# Patient Record
Sex: MALE | Race: WHITE | NOT HISPANIC OR LATINO | Employment: OTHER | URBAN - METROPOLITAN AREA
[De-identification: names, ages, dates, MRNs, and addresses within clinical notes are randomized per-mention and may not be internally consistent; named-entity substitution may affect disease eponyms.]

---

## 2017-04-12 ENCOUNTER — HOSPITAL ENCOUNTER (OUTPATIENT)
Dept: GENERAL RADIOLOGY | Facility: HOSPITAL | Age: 57
Discharge: HOME | End: 2017-04-12
Admitting: NURSE PRACTITIONER

## 2017-04-12 ENCOUNTER — TRANSCRIBE ORDERS (OUTPATIENT)
Dept: ADMINISTRATIVE | Facility: HOSPITAL | Age: 57
End: 2017-04-12

## 2017-04-12 DIAGNOSIS — M25.562 LEFT MEDIAL KNEE PAIN: Primary | ICD-10-CM

## 2017-04-12 DIAGNOSIS — M79.644 THUMB PAIN, RIGHT: ICD-10-CM

## 2017-04-12 PROCEDURE — 73130 X-RAY EXAM OF HAND: CPT

## 2017-04-12 PROCEDURE — 73562 X-RAY EXAM OF KNEE 3: CPT

## 2019-11-01 ENCOUNTER — TRANSCRIBE ORDERS (OUTPATIENT)
Dept: ADMINISTRATIVE | Facility: HOSPITAL | Age: 59
End: 2019-11-01

## 2019-11-01 DIAGNOSIS — M43.10 SPONDYLOLISTHESIS, ACQUIRED: Primary | ICD-10-CM

## 2019-11-15 ENCOUNTER — HOSPITAL ENCOUNTER (OUTPATIENT)
Dept: MRI IMAGING | Facility: HOSPITAL | Age: 59
Discharge: HOME | End: 2019-11-15

## 2019-11-15 DIAGNOSIS — M43.10 SPONDYLOLISTHESIS, ACQUIRED: ICD-10-CM

## 2019-11-19 ENCOUNTER — APPOINTMENT (OUTPATIENT)
Dept: MRI IMAGING | Facility: HOSPITAL | Age: 59
End: 2019-11-19

## 2021-02-23 ENCOUNTER — TRANSCRIBE ORDERS (OUTPATIENT)
Dept: ADMINISTRATIVE | Facility: HOSPITAL | Age: 61
End: 2021-02-23

## 2021-02-23 DIAGNOSIS — R31.29 HEMATURIA, MICROSCOPIC: Primary | ICD-10-CM

## 2021-04-07 ENCOUNTER — HOSPITAL ENCOUNTER (OUTPATIENT)
Dept: CT IMAGING | Facility: HOSPITAL | Age: 61
Discharge: HOME | End: 2021-04-07
Admitting: FAMILY MEDICINE

## 2021-04-07 DIAGNOSIS — R31.29 HEMATURIA, MICROSCOPIC: ICD-10-CM

## 2021-04-07 LAB — CREAT BLDA-MCNC: 0.9 MG/DL (ref 0.6–1.3)

## 2021-04-07 PROCEDURE — 25010000002 IOPAMIDOL 61 % SOLUTION: Performed by: FAMILY MEDICINE

## 2021-04-07 PROCEDURE — 82565 ASSAY OF CREATININE: CPT

## 2021-04-07 PROCEDURE — 74178 CT ABD&PLV WO CNTR FLWD CNTR: CPT

## 2021-04-07 RX ADMIN — IOPAMIDOL 100 ML: 612 INJECTION, SOLUTION INTRAVENOUS at 09:55

## 2021-08-31 ENCOUNTER — HOSPITAL ENCOUNTER (OUTPATIENT)
Dept: GENERAL RADIOLOGY | Facility: HOSPITAL | Age: 61
Discharge: HOME | End: 2021-08-31
Admitting: FAMILY MEDICINE

## 2021-08-31 ENCOUNTER — TRANSCRIBE ORDERS (OUTPATIENT)
Dept: ADMINISTRATIVE | Facility: HOSPITAL | Age: 61
End: 2021-08-31

## 2021-08-31 DIAGNOSIS — R07.81 RIB PAIN ON LEFT SIDE: Primary | ICD-10-CM

## 2021-08-31 DIAGNOSIS — R07.81 RIB PAIN ON LEFT SIDE: ICD-10-CM

## 2021-08-31 PROCEDURE — 71101 X-RAY EXAM UNILAT RIBS/CHEST: CPT

## 2021-09-17 ENCOUNTER — HOSPITAL ENCOUNTER (OUTPATIENT)
Dept: GENERAL RADIOLOGY | Facility: HOSPITAL | Age: 61
Discharge: HOME | End: 2021-09-17
Admitting: NURSE PRACTITIONER

## 2021-09-17 ENCOUNTER — TRANSCRIBE ORDERS (OUTPATIENT)
Dept: GENERAL RADIOLOGY | Facility: HOSPITAL | Age: 61
End: 2021-09-17

## 2021-09-17 DIAGNOSIS — M79.671 FOOT PAIN, RIGHT: Primary | ICD-10-CM

## 2021-09-17 PROCEDURE — 73630 X-RAY EXAM OF FOOT: CPT

## 2023-11-30 ENCOUNTER — TRANSCRIBE ORDERS (OUTPATIENT)
Dept: ADMINISTRATIVE | Facility: HOSPITAL | Age: 63
End: 2023-11-30
Payer: COMMERCIAL

## 2023-11-30 DIAGNOSIS — R10.13 ABDOMINAL PAIN, EPIGASTRIC: Primary | ICD-10-CM

## 2024-02-29 ENCOUNTER — APPOINTMENT (OUTPATIENT)
Dept: GENERAL RADIOLOGY | Facility: HOSPITAL | Age: 64
DRG: 682 | End: 2024-02-29
Payer: MEDICARE

## 2024-02-29 ENCOUNTER — HOSPITAL ENCOUNTER (INPATIENT)
Facility: HOSPITAL | Age: 64
LOS: 1 days | Discharge: HOME | DRG: 682 | End: 2024-03-02
Attending: EMERGENCY MEDICINE | Admitting: PEDIATRICS
Payer: MEDICARE

## 2024-02-29 DIAGNOSIS — I95.9 HYPOTENSION, UNSPECIFIED HYPOTENSION TYPE: ICD-10-CM

## 2024-02-29 DIAGNOSIS — R00.0 TACHYCARDIA: ICD-10-CM

## 2024-02-29 DIAGNOSIS — E87.1 HYPONATREMIA: ICD-10-CM

## 2024-02-29 DIAGNOSIS — N17.9 ACUTE KIDNEY INJURY: ICD-10-CM

## 2024-02-29 DIAGNOSIS — E87.6 HYPOKALEMIA: Primary | ICD-10-CM

## 2024-02-29 PROBLEM — R11.0 NAUSEA: Status: ACTIVE | Noted: 2024-02-29

## 2024-02-29 PROBLEM — R63.4 WEIGHT LOSS: Status: ACTIVE | Noted: 2024-02-29

## 2024-02-29 PROBLEM — R42 DIZZINESS: Status: ACTIVE | Noted: 2024-02-29

## 2024-02-29 LAB
ALBUMIN SERPL-MCNC: 4.3 G/DL (ref 3.5–5.2)
ALBUMIN/GLOB SERPL: 1.5 G/DL
ALP SERPL-CCNC: 110 U/L (ref 39–117)
ALT SERPL W P-5'-P-CCNC: 119 U/L (ref 1–41)
ANION GAP SERPL CALCULATED.3IONS-SCNC: 15 MMOL/L (ref 5–15)
AST SERPL-CCNC: 54 U/L (ref 1–40)
BASOPHILS # BLD AUTO: 0.03 10*3/MM3 (ref 0–0.2)
BASOPHILS NFR BLD AUTO: 0.2 % (ref 0–1.5)
BILIRUB SERPL-MCNC: 0.7 MG/DL (ref 0–1.2)
BUN SERPL-MCNC: 21 MG/DL (ref 8–23)
BUN/CREAT SERPL: 12.8 (ref 7–25)
CALCIUM SPEC-SCNC: 12 MG/DL (ref 8.6–10.5)
CHLORIDE SERPL-SCNC: 89 MMOL/L (ref 98–107)
CO2 SERPL-SCNC: 19 MMOL/L (ref 22–29)
CREAT SERPL-MCNC: 1.64 MG/DL (ref 0.76–1.27)
DEPRECATED RDW RBC AUTO: 43.7 FL (ref 37–54)
EGFRCR SERPLBLD CKD-EPI 2021: 46.1 ML/MIN/1.73
EOSINOPHIL # BLD AUTO: 0.06 10*3/MM3 (ref 0–0.4)
EOSINOPHIL NFR BLD AUTO: 0.4 % (ref 0.3–6.2)
ERYTHROCYTE [DISTWIDTH] IN BLOOD BY AUTOMATED COUNT: 12.1 % (ref 12.3–15.4)
GEN 5 2HR TROPONIN T REFLEX: 32 NG/L
GLOBULIN UR ELPH-MCNC: 2.9 GM/DL
GLUCOSE SERPL-MCNC: 120 MG/DL (ref 65–99)
HCT VFR BLD AUTO: 36.9 % (ref 37.5–51)
HGB BLD-MCNC: 13.4 G/DL (ref 13–17.7)
HOLD SPECIMEN: NORMAL
IMM GRANULOCYTES # BLD AUTO: 0.11 10*3/MM3 (ref 0–0.05)
IMM GRANULOCYTES NFR BLD AUTO: 0.7 % (ref 0–0.5)
LYMPHOCYTES # BLD AUTO: 0.82 10*3/MM3 (ref 0.7–3.1)
LYMPHOCYTES NFR BLD AUTO: 5.4 % (ref 19.6–45.3)
MAGNESIUM SERPL-MCNC: 2.2 MG/DL (ref 1.6–2.4)
MCH RBC QN AUTO: 35.5 PG (ref 26.6–33)
MCHC RBC AUTO-ENTMCNC: 36.3 G/DL (ref 31.5–35.7)
MCV RBC AUTO: 97.9 FL (ref 79–97)
MONOCYTES # BLD AUTO: 1.47 10*3/MM3 (ref 0.1–0.9)
MONOCYTES NFR BLD AUTO: 9.7 % (ref 5–12)
NEUTROPHILS NFR BLD AUTO: 12.71 10*3/MM3 (ref 1.7–7)
NEUTROPHILS NFR BLD AUTO: 83.6 % (ref 42.7–76)
NRBC BLD AUTO-RTO: 0 /100 WBC (ref 0–0.2)
NT-PROBNP SERPL-MCNC: 194.9 PG/ML (ref 0–900)
PHOSPHATE SERPL-MCNC: 1.6 MG/DL (ref 2.5–4.5)
PLATELET # BLD AUTO: 222 10*3/MM3 (ref 140–450)
PMV BLD AUTO: 9 FL (ref 6–12)
POTASSIUM SERPL-SCNC: 2.4 MMOL/L (ref 3.5–5.2)
PROT SERPL-MCNC: 7.2 G/DL (ref 6–8.5)
QT INTERVAL: 332 MS
QTC INTERVAL: 444 MS
RBC # BLD AUTO: 3.77 10*6/MM3 (ref 4.14–5.8)
SODIUM SERPL-SCNC: 123 MMOL/L (ref 136–145)
SODIUM SERPL-SCNC: 130 MMOL/L (ref 136–145)
TROPONIN T DELTA: -2 NG/L
TROPONIN T SERPL HS-MCNC: 34 NG/L
TROPONIN T SERPL HS-MCNC: 34 NG/L
TSH SERPL DL<=0.05 MIU/L-ACNC: 2.19 UIU/ML (ref 0.27–4.2)
WBC NRBC COR # BLD AUTO: 15.2 10*3/MM3 (ref 3.4–10.8)
WHOLE BLOOD HOLD COAG: NORMAL
WHOLE BLOOD HOLD SPECIMEN: NORMAL

## 2024-02-29 PROCEDURE — 0 DEXTROSE 5 % SOLUTION: Performed by: NURSE PRACTITIONER

## 2024-02-29 PROCEDURE — 85025 COMPLETE CBC W/AUTO DIFF WBC: CPT | Performed by: EMERGENCY MEDICINE

## 2024-02-29 PROCEDURE — 84100 ASSAY OF PHOSPHORUS: CPT | Performed by: INTERNAL MEDICINE

## 2024-02-29 PROCEDURE — G0378 HOSPITAL OBSERVATION PER HR: HCPCS

## 2024-02-29 PROCEDURE — 84295 ASSAY OF SERUM SODIUM: CPT | Performed by: NURSE PRACTITIONER

## 2024-02-29 PROCEDURE — 80053 COMPREHEN METABOLIC PANEL: CPT | Performed by: EMERGENCY MEDICINE

## 2024-02-29 PROCEDURE — 83735 ASSAY OF MAGNESIUM: CPT | Performed by: EMERGENCY MEDICINE

## 2024-02-29 PROCEDURE — 84484 ASSAY OF TROPONIN QUANT: CPT | Performed by: NURSE PRACTITIONER

## 2024-02-29 PROCEDURE — 99223 1ST HOSP IP/OBS HIGH 75: CPT | Performed by: INTERNAL MEDICINE

## 2024-02-29 PROCEDURE — 71045 X-RAY EXAM CHEST 1 VIEW: CPT

## 2024-02-29 PROCEDURE — 25810000003 SODIUM CHLORIDE 0.9 % SOLUTION: Performed by: EMERGENCY MEDICINE

## 2024-02-29 PROCEDURE — 99285 EMERGENCY DEPT VISIT HI MDM: CPT

## 2024-02-29 PROCEDURE — 25810000003 SODIUM CHLORIDE 0.9 % SOLUTION: Performed by: NURSE PRACTITIONER

## 2024-02-29 PROCEDURE — 83880 ASSAY OF NATRIURETIC PEPTIDE: CPT | Performed by: EMERGENCY MEDICINE

## 2024-02-29 PROCEDURE — 25010000002 HEPARIN (PORCINE) PER 1000 UNITS: Performed by: NURSE PRACTITIONER

## 2024-02-29 PROCEDURE — 84443 ASSAY THYROID STIM HORMONE: CPT | Performed by: EMERGENCY MEDICINE

## 2024-02-29 PROCEDURE — 25010000002 THIAMINE PER 100 MG: Performed by: NURSE PRACTITIONER

## 2024-02-29 PROCEDURE — 36415 COLL VENOUS BLD VENIPUNCTURE: CPT

## 2024-02-29 PROCEDURE — 25010000002 POTASSIUM CHLORIDE 10 MEQ/100ML SOLUTION: Performed by: EMERGENCY MEDICINE

## 2024-02-29 PROCEDURE — 93005 ELECTROCARDIOGRAM TRACING: CPT | Performed by: EMERGENCY MEDICINE

## 2024-02-29 PROCEDURE — 84484 ASSAY OF TROPONIN QUANT: CPT | Performed by: EMERGENCY MEDICINE

## 2024-02-29 RX ORDER — LORAZEPAM 1 MG/1
2 TABLET ORAL
Status: DISCONTINUED | OUTPATIENT
Start: 2024-02-29 | End: 2024-03-02 | Stop reason: HOSPADM

## 2024-02-29 RX ORDER — HEPARIN SODIUM 5000 [USP'U]/ML
5000 INJECTION, SOLUTION INTRAVENOUS; SUBCUTANEOUS EVERY 8 HOURS SCHEDULED
Status: DISCONTINUED | OUTPATIENT
Start: 2024-02-29 | End: 2024-03-02 | Stop reason: HOSPADM

## 2024-02-29 RX ORDER — MIDAZOLAM HYDROCHLORIDE 1 MG/ML
4 INJECTION INTRAMUSCULAR; INTRAVENOUS
Status: DISCONTINUED | OUTPATIENT
Start: 2024-02-29 | End: 2024-03-02 | Stop reason: HOSPADM

## 2024-02-29 RX ORDER — SODIUM CHLORIDE 9 MG/ML
100 INJECTION, SOLUTION INTRAVENOUS CONTINUOUS
Status: DISCONTINUED | OUTPATIENT
Start: 2024-02-29 | End: 2024-02-29

## 2024-02-29 RX ORDER — ONDANSETRON 4 MG/1
4 TABLET, FILM COATED ORAL EVERY 8 HOURS PRN
COMMUNITY
End: 2024-02-29

## 2024-02-29 RX ORDER — LORAZEPAM 1 MG/1
1 TABLET ORAL
Status: DISCONTINUED | OUTPATIENT
Start: 2024-02-29 | End: 2024-03-02 | Stop reason: HOSPADM

## 2024-02-29 RX ORDER — THIAMINE HYDROCHLORIDE 100 MG/ML
200 INJECTION, SOLUTION INTRAMUSCULAR; INTRAVENOUS EVERY 8 HOURS SCHEDULED
Status: DISCONTINUED | OUTPATIENT
Start: 2024-02-29 | End: 2024-03-02 | Stop reason: HOSPADM

## 2024-02-29 RX ORDER — BISACODYL 10 MG
10 SUPPOSITORY, RECTAL RECTAL DAILY PRN
Status: DISCONTINUED | OUTPATIENT
Start: 2024-02-29 | End: 2024-03-02 | Stop reason: HOSPADM

## 2024-02-29 RX ORDER — POLYETHYLENE GLYCOL 3350 17 G/17G
17 POWDER, FOR SOLUTION ORAL DAILY PRN
Status: DISCONTINUED | OUTPATIENT
Start: 2024-02-29 | End: 2024-03-02 | Stop reason: HOSPADM

## 2024-02-29 RX ORDER — ALPRAZOLAM 0.25 MG/1
0.12 TABLET ORAL DAILY PRN
Status: DISCONTINUED | OUTPATIENT
Start: 2024-02-29 | End: 2024-03-02

## 2024-02-29 RX ORDER — PRAZOSIN HYDROCHLORIDE 5 MG/1
5 CAPSULE ORAL NIGHTLY
COMMUNITY

## 2024-02-29 RX ORDER — BISACODYL 5 MG/1
5 TABLET, DELAYED RELEASE ORAL DAILY PRN
Status: DISCONTINUED | OUTPATIENT
Start: 2024-02-29 | End: 2024-03-02 | Stop reason: HOSPADM

## 2024-02-29 RX ORDER — ONDANSETRON 4 MG/1
4 TABLET, ORALLY DISINTEGRATING ORAL EVERY 8 HOURS PRN
COMMUNITY

## 2024-02-29 RX ORDER — SODIUM CHLORIDE 9 MG/ML
40 INJECTION, SOLUTION INTRAVENOUS AS NEEDED
Status: CANCELLED | OUTPATIENT
Start: 2024-02-29

## 2024-02-29 RX ORDER — MIDAZOLAM HYDROCHLORIDE 1 MG/ML
2 INJECTION INTRAMUSCULAR; INTRAVENOUS
Status: DISCONTINUED | OUTPATIENT
Start: 2024-02-29 | End: 2024-03-02 | Stop reason: HOSPADM

## 2024-02-29 RX ORDER — PANTOPRAZOLE SODIUM 40 MG/1
40 TABLET, DELAYED RELEASE ORAL
Status: DISCONTINUED | OUTPATIENT
Start: 2024-03-01 | End: 2024-03-02 | Stop reason: HOSPADM

## 2024-02-29 RX ORDER — AMOXICILLIN 250 MG
2 CAPSULE ORAL 2 TIMES DAILY PRN
Status: DISCONTINUED | OUTPATIENT
Start: 2024-02-29 | End: 2024-03-02 | Stop reason: HOSPADM

## 2024-02-29 RX ORDER — SODIUM CHLORIDE 0.9 % (FLUSH) 0.9 %
10 SYRINGE (ML) INJECTION AS NEEDED
Status: DISCONTINUED | OUTPATIENT
Start: 2024-02-29 | End: 2024-03-02 | Stop reason: HOSPADM

## 2024-02-29 RX ORDER — PRAZOSIN HYDROCHLORIDE 1 MG/1
5 CAPSULE ORAL NIGHTLY
Status: CANCELLED | OUTPATIENT
Start: 2024-02-29

## 2024-02-29 RX ORDER — DEXTROSE MONOHYDRATE 50 MG/ML
75 INJECTION, SOLUTION INTRAVENOUS CONTINUOUS
Status: DISCONTINUED | OUTPATIENT
Start: 2024-02-29 | End: 2024-03-02

## 2024-02-29 RX ORDER — OMEPRAZOLE 40 MG/1
40 CAPSULE, DELAYED RELEASE ORAL DAILY
COMMUNITY

## 2024-02-29 RX ORDER — POTASSIUM CHLORIDE 7.45 MG/ML
10 INJECTION INTRAVENOUS ONCE
Status: COMPLETED | OUTPATIENT
Start: 2024-02-29 | End: 2024-02-29

## 2024-02-29 RX ORDER — ALPRAZOLAM 0.25 MG/1
0.12 TABLET ORAL DAILY PRN
COMMUNITY

## 2024-02-29 RX ORDER — FOLIC ACID 1 MG/1
1 TABLET ORAL DAILY
Status: DISCONTINUED | OUTPATIENT
Start: 2024-02-29 | End: 2024-03-02 | Stop reason: HOSPADM

## 2024-02-29 RX ORDER — PRAVASTATIN SODIUM 40 MG
40 TABLET ORAL DAILY
COMMUNITY

## 2024-02-29 RX ORDER — SODIUM CHLORIDE 0.9 % (FLUSH) 0.9 %
10 SYRINGE (ML) INJECTION EVERY 12 HOURS SCHEDULED
Status: DISCONTINUED | OUTPATIENT
Start: 2024-02-29 | End: 2024-03-02 | Stop reason: HOSPADM

## 2024-02-29 RX ORDER — ONDANSETRON 4 MG/1
4 TABLET, ORALLY DISINTEGRATING ORAL EVERY 8 HOURS PRN
Status: DISCONTINUED | OUTPATIENT
Start: 2024-02-29 | End: 2024-03-02 | Stop reason: HOSPADM

## 2024-02-29 RX ORDER — POTASSIUM CHLORIDE 1.5 G/1.58G
40 POWDER, FOR SOLUTION ORAL ONCE
Status: COMPLETED | OUTPATIENT
Start: 2024-02-29 | End: 2024-02-29

## 2024-02-29 RX ORDER — CLOTRIMAZOLE 1 %
1 CREAM (GRAM) TOPICAL 2 TIMES DAILY
COMMUNITY
End: 2024-03-02 | Stop reason: HOSPADM

## 2024-02-29 RX ORDER — PRAVASTATIN SODIUM 40 MG
40 TABLET ORAL NIGHTLY
Status: DISCONTINUED | OUTPATIENT
Start: 2024-02-29 | End: 2024-03-02 | Stop reason: HOSPADM

## 2024-02-29 RX ADMIN — POTASSIUM & SODIUM PHOSPHATES POWDER PACK 280-160-250 MG 2 PACKET: 280-160-250 PACK at 18:47

## 2024-02-29 RX ADMIN — PRAVASTATIN SODIUM 40 MG: 40 TABLET ORAL at 21:46

## 2024-02-29 RX ADMIN — SODIUM CHLORIDE 1000 ML: 9 INJECTION, SOLUTION INTRAVENOUS at 14:46

## 2024-02-29 RX ADMIN — POTASSIUM CHLORIDE 40 MEQ: 1.5 POWDER, FOR SOLUTION ORAL at 14:52

## 2024-02-29 RX ADMIN — SODIUM CHLORIDE 100 ML/HR: 9 INJECTION, SOLUTION INTRAVENOUS at 18:15

## 2024-02-29 RX ADMIN — Medication 10 ML: at 21:44

## 2024-02-29 RX ADMIN — THIAMINE HYDROCHLORIDE 200 MG: 100 INJECTION, SOLUTION INTRAMUSCULAR; INTRAVENOUS at 21:43

## 2024-02-29 RX ADMIN — FOLIC ACID 1 MG: 1 TABLET ORAL at 21:46

## 2024-02-29 RX ADMIN — HEPARIN SODIUM 5000 UNITS: 5000 INJECTION INTRAVENOUS; SUBCUTANEOUS at 21:47

## 2024-02-29 RX ADMIN — POTASSIUM CHLORIDE 10 MEQ: 7.46 INJECTION, SOLUTION INTRAVENOUS at 14:54

## 2024-02-29 RX ADMIN — ALPRAZOLAM 0.12 MG: 0.25 TABLET ORAL at 22:15

## 2024-02-29 RX ADMIN — DEXTROSE MONOHYDRATE 75 ML/HR: 50 INJECTION, SOLUTION INTRAVENOUS at 21:44

## 2024-02-29 RX ADMIN — SODIUM CHLORIDE 1000 ML: 9 INJECTION, SOLUTION INTRAVENOUS at 14:57

## 2024-02-29 NOTE — H&P
[facility] Medicine Services  HISTORY AND PHYSICAL    Patient Name: Campos Alba  : 1960  MRN: [id number]  Primary Care Physician: Kelby Mann MD  Date of admission: 2024    Subjective   Subjective     Chief Complaint:  Dizziness, Nausea, weight loss    HPI:  Campos Alba is a 63 y.o. male with PMH of HTN, HLP who presented to the ED at the request of his PCP for EKG changes.  He denies chest pain.  His primary complaint in nausea, dizziness and unintentional 50lb weight loss since October.  These symptoms have waxed and waned since October, but he feels they have gotten worse over the last week.  He was found to have multiple electrolyte abnormalities and is being admitted to the hospitalist service for further treatment.        Review of Systems   Constitutional:  Positive for appetite change and fatigue. Negative for fever.   HENT:  Negative for congestion and hearing loss.    Eyes:  Negative for visual disturbance.   Respiratory:  Positive for shortness of breath. Negative for chest tightness.    Cardiovascular:  Negative for chest pain and palpitations.   Gastrointestinal:  Positive for nausea and vomiting. Negative for abdominal pain, constipation and diarrhea.   Genitourinary:  Negative for dysuria and hematuria.   Musculoskeletal:  Negative for back pain.   Neurological:  Positive for dizziness and weakness.   Psychiatric/Behavioral:  Negative for behavioral problems and confusion.         Personal History     Past Medical History:   Diagnosis Date    HTN (hypertension)     Hyperlipidemia        Past Surgical History:   Procedure Laterality Date    ANKLE SURGERY Left 2006    TOTAL HIP ARTHROPLASTY Right 2012    WISDOM TOOTH EXTRACTION         Family History: family history includes Alzheimer's disease in his father; Arthritis in his father; Hypertension in his father; No Known Problems in his mother.     Social History:  reports that he has never  smoked. He does not have any smokeless tobacco history on file. He reports current alcohol use. He reports that he does not use drugs.  Social History     Social History Narrative    Lives alone in [place]    Not active with HH    Retired        Medications:  ALPRAZolam, clotrimazole, omeprazole, ondansetron ODT, pravastatin, and prazosin    No Known Allergies    Objective   Objective     Vital Signs:   Temp:  [98.2 °F (36.8 °C)] 98.2 °F (36.8 °C)  Heart Rate:  [] 86  Resp:  [14] 14  BP: ()/(54-91) 125/86    Physical Exam   Constitutional: Awake, alert, friends at bedside  Eyes: PERRLA, sclerae anicteric, no conjunctival injection  HENT: NCAT, mucous membranes moist  Neck: Supple, no thyromegaly, no lymphadenopathy, trachea midline  Respiratory: Clear to auscultation bilaterally, nonlabored respirations   Cardiovascular: RRR, no murmurs, rubs, or gallops, palpable pedal pulses bilaterally  Gastrointestinal: Positive bowel sounds, soft, nontender, nondistended  Musculoskeletal: No bilateral ankle edema, no clubbing or cyanosis to extremities  Psychiatric: Appropriate affect, cooperative  Neurologic: Oriented x 3, KUMAR, speech clear  Skin: No rashes noted    Result Review:  I have personally reviewed the results from the time of this admission to 2/29/2024 17:37 EST and agree with these findings:  [x]  Laboratory list / accordion  []  Microbiology  []  Radiology  [x]  EKG/Telemetry   []  Cardiology/Vascular   []  Pathology  [x]  Old records  []  Other:      LAB RESULTS:      Lab 02/29/24  1300   WBC 15.20*   HEMOGLOBIN 13.4   HEMATOCRIT 36.9*   PLATELETS 222   NEUTROS ABS 12.71*   IMMATURE GRANS (ABS) 0.11*   LYMPHS ABS 0.82   MONOS ABS 1.47*   EOS ABS 0.06   MCV 97.9*         Lab 02/29/24  1300   SODIUM 123*   POTASSIUM 2.4*   CHLORIDE 89*   CO2 19.0*   ANION GAP 15.0   BUN 21   CREATININE 1.64*   EGFR 46.1*   GLUCOSE 120*   CALCIUM 12.0*   MAGNESIUM 2.2   PHOSPHORUS 1.6*   TSH 2.190         Lab  02/29/24  1300   TOTAL PROTEIN 7.2   ALBUMIN 4.3   GLOBULIN 2.9   ALT (SGPT) 119*   AST (SGOT) 54*   BILIRUBIN 0.7   ALK PHOS 110         Lab 02/29/24  1300   PROBNP 194.9   HSTROP T 34*           Brief Urine Lab Results       None          Microbiology Results (last 10 days)       ** No results found for the last 240 hours. **            XR Chest 1 View    Result Date: 2/29/2024  XR CHEST 1 VW Date of Exam: 2/29/2024 12:14 PM EST Indication: Dysrhythmia triage protocol Comparison: 8/31/2021 Findings: The lungs appear adequately aerated without consolidation or mass. No pleural effusion or pneumothorax is identified. The cardiomediastinal silhouette and pulmonary vasculature appear within normal limits. No acute or suspicious osseous lesion is identified.     Impression: Impression: 1.No acute radiographic abnormality is identified. Electronically Signed: Vitaliy Smith MD  2/29/2024 12:43 PM EST  Workstation ID: [id number]         Assessment & Plan   Assessment & Plan       Hyponatremia    Hypokalemia    Nausea    Dizziness    Weight loss    ZAIRE (acute kidney injury)    Nausea  Unintentional weight loss (50lbs since October)  Poor appetite  --GI consult  --EGD in Nov 23 which was reportedly negative  --uses CBD gummies    Dizziness  --check CT head with contrast 3/1/24 if creatinine improved  --questionable med related + dehydration  --check orthostatics    Hyponatremia  Hypokalemia  Hypophosphatemia  --has had 2L saline in ED, will recheck Na before ordering more IVF  --replace per protocol  --recheck labs in am, Na check q4h overnight    ZAIRE  --recheck in am after IVF    ETOH use   --drinks on a daily basis (few beers + bourbon)  --states his last drink was a week ago  --continue home xanax  --withdrawal protocol  --addiction team to see  --low Na may be his baseline with ETOH use    EKG changes  --troponin 34, will trend  --possible ST depression on 1st EKG in ED  --denies chest pain, continue to  monitor    Recent Balanitis  --follows with Dr. Simmons  --office notes mention possible need for biopsy    Total time spent: 60 minutes  Time spent includes time reviewing chart, face-to-face time, counseling patient/family/caregiver, ordering medications/tests/procedures, communicating with other health care professionals, documenting clinical information in the electronic health record, and coordination of care.      DVT prophylaxis:  Lovenox    CODE STATUS:    Medical Intervention Limits: NO intubation (DNI)  Level Of Support Discussed With: Patient  Code Status (Patient has no pulse and is not breathing): No CPR (Do Not Attempt to Resuscitate)  Medical Interventions (Patient has pulse or is breathing): Limited Support      Expected Discharge  Expected Discharge Date: 3/2/2024; Expected Discharge Time:       This note has been completed as part of a split-shared workflow.     Signature: Electronically signed by CHIKA Arevalo, 02/29/24, 5:37 PM EST    Patient seen and examined at the bedside.  Patient is a 63-year-old with past medical history significant for hypertension, hyperlipidemia, AAA drinks almost every day.  Evidently patient has had nausea since last October.  He tells me that it started suddenly but it has continued and it has worsened.  He tells me that the severity of it actually fluctuates a little bit.  That has affected his appetite in a major way and it seems that he has lost quite a bit of weight for the past several months.  Patient denies vomiting except a few episodes.  Patient has been referred to gastroenterology by his PCP and patient has seen Dr. Sethi.  He tells me that Dr. Sethi has done an upper endoscopy.  Patient tells me that the endoscopy revealed gastritis and there was some suspicion of celiac disease also.  Patient is being admitted for severe nausea and inability to have adequate oral intake.  Patient also tells me that since the onset of nausea he has noticed that  gradually his memory has weakened.  Lately he also has had dizziness and fatigue.  Denies any fever or chills.  No chest pain or palpitation.    Constitutional: No acute distress, awake, alert  HENT: NCAT, mucous membranes moist  Respiratory: Clear to auscultation bilaterally, respiratory effort normal   Cardiovascular: RRR, no murmurs, rubs, or gallops  Gastrointestinal: Positive bowel sounds, soft, nontender, nondistended  Musculoskeletal: No bilateral ankle edema  Psychiatric: Appropriate affect, cooperative  Neurologic: Oriented x 3, strength symmetric in all extremities, Cranial Nerves grossly intact to confrontation, speech clear  Skin: No rashes     Assessment and plan:  63-year-old male with past medical history of hypertension, hyperlipidemia.  He drinks regularly.  He also uses CBD Gummies.  He has had nausea since last October which has gradually gotten worse.  Lately he has had dizziness, fatigue, difficulty with memory also.    Evaluation at the emergency room also shows elevated creatinine at 2.4, hyponatremia, hypophosphatemia. Will admit the patient to telemetry for observation.   recommend imaging of the brain either by CT with contrast been patient's renal function allows or MRI without contrast.  Total time spent was 55 minutes.

## 2024-02-29 NOTE — ED NOTES
" Campos Alba    Nursing Report ED to Floor:  Mental status: AO4  Ambulatory status: X1  Oxygen Therapy:  RA  Cardiac Rhythm: SINUS WITH 1ST DEGREE BLOCK  Admitted from: ED  Safety Concerns:  FALL RISK  Social Issues: NONE  ED Room #:  12    ED Nurse Phone Extension - [phone] or may call [phone].      HPI:   Chief Complaint   Patient presents with    Rapid Heart Rate       Past Medical History:  Past Medical History:   Diagnosis Date    HTN (hypertension)     Hyperlipidemia         Past Surgical History:  Past Surgical History:   Procedure Laterality Date    ANKLE SURGERY Left 2006    TOTAL HIP ARTHROPLASTY Right 2012    WISDOM TOOTH EXTRACTION          Admitting Doctor:   Leonardo Doran MD    Consulting Provider(s):  Consults       No orders found from 1/31/2024 to 3/1/2024.             Admitting Diagnosis:   The primary encounter diagnosis was Hypokalemia. Diagnoses of Hyponatremia, Acute kidney injury, Tachycardia, and Hypotension, unspecified hypotension type were also pertinent to this visit.    Most Recent Vitals:   Vitals:    02/29/24 1120 02/29/24 1438 02/29/24 1439 02/29/24 1500   BP: 90/54 97/60  107/91   BP Location: Right arm      Patient Position: Sitting      Pulse: 109  95 93   Resp: 14      Temp: 98.2 °F (36.8 °C)      TempSrc: Oral      SpO2: 99%  98% 98%   Weight: 100 kg (221 lb)      Height: 182.9 cm (72\")          Active LDAs/IV Access:   Lines, Drains & Airways       Active LDAs       Name Placement date Placement time Site Days    Peripheral IV 02/29/24 1443 Left Antecubital 02/29/24  1443  Antecubital  less than 1                    Labs (abnormal labs have a star):   Labs Reviewed   COMPREHENSIVE METABOLIC PANEL - Abnormal; Notable for the following components:       Result Value    Glucose 120 (*)     Creatinine 1.64 (*)     Sodium 123 (*)     Potassium 2.4 (*)     Chloride 89 (*)     CO2 19.0 (*)     Calcium 12.0 (*)     ALT (SGPT) 119 (*)     AST (SGOT) 54 (*)     eGFR 46.1 (*)     " All other components within normal limits    Narrative:     GFR Normal >60  Chronic Kidney Disease <60  Kidney Failure <15     SINGLE HSTROPONIN T - Abnormal; Notable for the following components:    HS Troponin T 34 (*)     All other components within normal limits    Narrative:     High Sensitive Troponin T Reference Range:  <14.0 ng/L- Negative Female for AMI  <22.0 ng/L- Negative Male for AMI  >=14 - Abnormal Female indicating possible myocardial injury.  >=22 - Abnormal Male indicating possible myocardial injury.   Clinicians would have to utilize clinical acumen, EKG, Troponin, and serial changes to determine if it is an Acute Myocardial Infarction or myocardial injury due to an underlying chronic condition.        CBC WITH AUTO DIFFERENTIAL - Abnormal; Notable for the following components:    WBC 15.20 (*)     RBC 3.77 (*)     Hematocrit 36.9 (*)     MCV 97.9 (*)     MCH 35.5 (*)     MCHC 36.3 (*)     RDW 12.1 (*)     Neutrophil % 83.6 (*)     Lymphocyte % 5.4 (*)     Immature Grans % 0.7 (*)     Neutrophils, Absolute 12.71 (*)     Monocytes, Absolute 1.47 (*)     Immature Grans, Absolute 0.11 (*)     All other components within normal limits   PHOSPHORUS - Abnormal; Notable for the following components:    Phosphorus 1.6 (*)     All other components within normal limits   MAGNESIUM - Normal   TSH - Normal   BNP (IN-HOUSE) - Normal    Narrative:     This assay is used as an aid in the diagnosis of individuals suspected of having heart failure. It can be used as an aid in the diagnosis of acute decompensated heart failure (ADHF) in patients presenting with signs and symptoms of ADHF to the emergency department (ED). In addition, NT-proBNP of <300 pg/mL indicates ADHF is not likely.    Age Range Result Interpretation  NT-proBNP Concentration (pg/mL:      <50             Positive            >450                   Gray                 300-450                    Negative             <300    50-75            Positive            >900                  Gray                300-900                  Negative            <300      >75             Positive            >1800                  Gray                300-1800                  Negative            <300   RAINBOW DRAW    Narrative:     The following orders were created for panel order [place] Draw.  Procedure                               Abnormality         Status                     ---------                               -----------         ------                     Green Top (Gel)[[id number]]                                  Final result               Lavender Top[[id number]]                                     Final result               Gold Top - SST[[id number]]                                   Final result               Gray Top[[id number]]                                         In process                 Light Blue Top[[id number]]                                   Final result                 Please view results for these tests on the individual orders.   CBC AND DIFFERENTIAL    Narrative:     The following orders were created for panel order CBC & Differential.  Procedure                               Abnormality         Status                     ---------                               -----------         ------                     CBC Auto Differential[[id number]]        Abnormal            Final result                 Please view results for these tests on the individual orders.   GREEN TOP   LAVENDER TOP   GOLD TOP - SST   LIGHT BLUE TOP   GRAY TOP       Meds Given in ED:   Medications   sodium chloride 0.9 % flush 10 mL (has no administration in time range)   sodium chloride 0.9 % bolus 1,000 mL (1,000 mL Intravenous New Bag 2/29/24 1446)   sodium chloride 0.9 % bolus 1,000 mL (1,000 mL Intravenous New Bag 2/29/24 1457)   potassium chloride 10 mEq in 100 mL IVPB (10 mEq Intravenous New Bag 2/29/24 1454)   potassium chloride (KLOR-CON) packet 40 mEq (40 mEq Oral  Given 2/29/24 1302)

## 2024-02-29 NOTE — ED PROVIDER NOTES
"[facility]    EMERGENCY DEPARTMENT ENCOUNTER      Pt Name: Campos Alba  MRN: [id number]  YOB: 1960  Date of evaluation: 2/29/2024  Provider: Dakota Murillo MD    CHIEF COMPLAINT       Chief Complaint   Patient presents with    Rapid Heart Rate         HISTORY OF PRESENT ILLNESS   Campos Alba is a 63 y.o. male who presents to the emergency department ***       Nursing notes were reviewed.    REVIEW OF SYSTEMS     ROS:  A chief complaint appropriate review of systems was completed and is negative except as noted in the HPI.      PAST MEDICAL HISTORY   No past medical history on file.      SURGICAL HISTORY     No past surgical history on file.      CURRENT MEDICATIONS       Current Facility-Administered Medications:     sodium chloride 0.9 % flush 10 mL, 10 mL, Intravenous, PRN, Dakota Murillo MD  No current outpatient medications on file.    ALLERGIES     Patient has no known allergies.    FAMILY HISTORY     No family history on file.       SOCIAL HISTORY       Social History     Socioeconomic History    Marital status:          PHYSICAL EXAM    (up to 7 for level 4, 8 or more for level 5)     Vitals:    02/29/24 1120   BP: 90/54   BP Location: Right arm   Patient Position: Sitting   Pulse: 109   Resp: 14   Temp: 98.2 °F (36.8 °C)   TempSrc: Oral   SpO2: 99%   Weight: 100 kg (221 lb)   Height: 182.9 cm (72\")       ***      DIAGNOSTIC RESULTS     EKG: All EKGs are interpreted by the Emergency Department Physician who either signs or Co-signs this chart in the absence of a cardiologist.    ECG 12 Lead ED Triage Standing Order; Dysrhythmia    (Results Pending)         RADIOLOGY:   [x] Radiologist's Report Reviewed:  XR Chest 1 View    (Results Pending)       I ordered and independently reviewed the above noted radiographic studies.        LABS:    I have reviewed and interpreted all of the currently available lab results from this visit (if applicable):  Results for orders " placed or performed during the hospital encounter of 04/07/21   POC Creatinine    Specimen: Blood   Result Value Ref Range    Creatinine 0.90 0.60 - 1.30 mg/dL        If labs were ordered, I independently reviewed the results and considered them in treating the patient.      EMERGENCY DEPARTMENT COURSE and DIFFERENTIAL DIAGNOSIS/MDM:   Vitals:  AS OF 11:29 EST    BP - 90/54  HR - 109  TEMP - 98.2 °F (36.8 °C) (Oral)  O2 SATS - 99%        Discussion below represents my analysis of pertinent findings related to patient's condition, differential diagnosis, treatment plan and final disposition.      Differential diagnosis:  The differential diagnosis associated with the patient's presentation includes: ***      Independent interpretations (ECG/rhythm strip/X-ray/US/CT scan): ***      Additional sources:  Discussed/obtained information from independent historians:   [] Spouse:   [] Parent:   [] Friend:   [] EMS:   [] Other:  External (non-ED) record review:   [] Inpatient record:   [] Office record:   [] Outpatient record:   [] Prior Outpatient labs:   [] Prior Outpatient radiology:   [] Primary Care record:   [] Outside ED record:   [] Other:       Patient's care impacted by:   [] Diabetes   [] Hypertension   [] Coronary Artery Disease   [] Cancer   [] Other:     Care significantly affected by Social Determinants of Health (housing and economic circumstances, unemployment)    [] Yes     [] No   If yes, Patient's care significantly limited by  Social Determinants of Health including:    [] Inadequate housing    [] Low income    [] Alcoholism and drug addiction in family    [] Problems related to primary support group    [] Unemployment    [] Problems related to employment    [] Other Social Determinants of Health:       Consideration of admission/observation vs discharge: ***      I considered prescription management with: ***   [] Pain medication:   [] Antiviral:   [] Antibiotic:   [] Other:    Additional orders  considered but not ordered:  The following testing was considered but ultimately not selected after discussion with patient/family: ***    ED Course:           ***    I had a discussion with the patient/family regarding diagnosis, diagnostic results, treatment plan, and medications.  The patient/family indicated understanding of these instructions.  I spent adequate time at the bedside preceding discharge necessary to personally discuss the aftercare instructions, giving patient education, providing explanations of the results of our evaluations/findings, and my decision making to assure that the patient/family understand the plan of care.  Time was allotted to answer questions at that time and throughout the ED course.  Emphasis was placed on timely follow-up after discharge.  I also discussed the potential for the development of an acute emergent condition requiring further evaluation, admission, or even surgical intervention. I discussed that we found nothing during the visit today indicating the need for further workup, admission, or the presence of an unstable medical condition.  I encouraged the patient to return to the emergency department immediately for ANY concerns, worsening, new complaints, or if symptoms persist and unable to seek follow-up in a timely fashion.  The patient/family expressed understanding and agreement with this plan.  The patient will follow-up with their PCP in 1-2 days for reevaluation.           PROCEDURES:  Procedures    CRITICAL CARE TIME        FINAL IMPRESSION    No diagnosis found.      DISPOSITION/PLAN     ED Disposition       None              Comment: Please note this report has been produced using speech recognition software.      Dakota Murillo MD  Attending Emergency Physician           Workstation ID: [id number]      XR Chest 1 View   Final Result   Impression:   1.No acute radiographic abnormality is identified.      Electronically Signed: Vitaliy Smith MD     2/29/2024 12:43 PM EST     Workstation ID: [id number]          I ordered and independently reviewed the above noted radiographic studies.        LABS:    I have reviewed and interpreted all of the currently available lab results from this visit (if applicable):  Results for orders placed or performed during the hospital encounter of 02/29/24   Comprehensive Metabolic Panel    Specimen: Blood   Result Value Ref Range    Glucose 120 (H) 65 - 99 mg/dL    BUN 21 8 - 23 mg/dL    Creatinine 1.64 (H) 0.76 - 1.27 mg/dL    Sodium 123 (L) 136 - 145 mmol/L    Potassium 2.4 (C) 3.5 - 5.2 mmol/L    Chloride 89 (L) 98 - 107 mmol/L    CO2 19.0 (L) 22.0 - 29.0 mmol/L    Calcium 12.0 (H) 8.6 - 10.5 mg/dL    Total Protein 7.2 6.0 - 8.5 g/dL    Albumin 4.3 3.5 - 5.2 g/dL    ALT (SGPT) 119 (H) 1 - 41 U/L    AST (SGOT) 54 (H) 1 - 40 U/L    Alkaline Phosphatase 110 39 - 117 U/L    Total Bilirubin 0.7 0.0 - 1.2 mg/dL    Globulin 2.9 gm/dL    A/G Ratio 1.5 g/dL    BUN/Creatinine Ratio 12.8 7.0 - 25.0    Anion Gap 15.0 5.0 - 15.0 mmol/L    eGFR 46.1 (L) >60.0 mL/min/1.73   Magnesium    Specimen: Blood   Result Value Ref Range    Magnesium 2.2 1.6 - 2.4 mg/dL   Single High Sensitivity Troponin T    Specimen: Blood   Result Value Ref Range    HS Troponin T 34 (H) <22 ng/L   TSH    Specimen: Blood   Result Value Ref Range    TSH 2.190 0.270 - 4.200 uIU/mL   BNP    Specimen: Blood   Result Value Ref Range    proBNP 194.9 0.0 - 900.0 pg/mL   CBC Auto Differential    Specimen: Blood   Result Value Ref Range    WBC 15.20 (H) 3.40 - 10.80 10*3/mm3    RBC 3.77 (L) 4.14 - 5.80 10*6/mm3    Hemoglobin 13.4 13.0 - 17.7 g/dL    Hematocrit 36.9 (L) 37.5 - 51.0 %    MCV 97.9 (H) 79.0 - 97.0 fL    MCH 35.5 (H) 26.6 - 33.0 pg    MCHC 36.3 (H) 31.5 - 35.7 g/dL    RDW 12.1 (L) 12.3 -  15.4 %    RDW-SD 43.7 37.0 - 54.0 fl    MPV 9.0 6.0 - 12.0 fL    Platelets 222 140 - 450 10*3/mm3    Neutrophil % 83.6 (H) 42.7 - 76.0 %    Lymphocyte % 5.4 (L) 19.6 - 45.3 %    Monocyte % 9.7 5.0 - 12.0 %    Eosinophil % 0.4 0.3 - 6.2 %    Basophil % 0.2 0.0 - 1.5 %    Immature Grans % 0.7 (H) 0.0 - 0.5 %    Neutrophils, Absolute 12.71 (H) 1.70 - 7.00 10*3/mm3    Lymphocytes, Absolute 0.82 0.70 - 3.10 10*3/mm3    Monocytes, Absolute 1.47 (H) 0.10 - 0.90 10*3/mm3    Eosinophils, Absolute 0.06 0.00 - 0.40 10*3/mm3    Basophils, Absolute 0.03 0.00 - 0.20 10*3/mm3    Immature Grans, Absolute 0.11 (H) 0.00 - 0.05 10*3/mm3    nRBC 0.0 0.0 - 0.2 /100 WBC   Phosphorus    Specimen: Blood   Result Value Ref Range    Phosphorus 1.6 (C) 2.5 - 4.5 mg/dL   High Sensitivity Troponin T    Specimen: Blood   Result Value Ref Range    HS Troponin T 34 (H) <22 ng/L   Sodium    Specimen: Blood   Result Value Ref Range    Sodium 130 (L) 136 - 145 mmol/L   Sodium    Specimen: Blood   Result Value Ref Range    Sodium 130 (L) 136 - 145 mmol/L   High Sensitivity Troponin T 2Hr    Specimen: Blood   Result Value Ref Range    HS Troponin T 32 (H) <22 ng/L    Troponin T Delta -2 >=-4 - <+4 ng/L   Phosphorus    Specimen: Blood   Result Value Ref Range    Phosphorus 1.5 (C) 2.5 - 4.5 mg/dL   CBC Auto Differential    Specimen: Blood   Result Value Ref Range    WBC 8.21 3.40 - 10.80 10*3/mm3    RBC 3.15 (L) 4.14 - 5.80 10*6/mm3    Hemoglobin 11.2 (L) 13.0 - 17.7 g/dL    Hematocrit 30.0 (L) 37.5 - 51.0 %    MCV 95.2 79.0 - 97.0 fL    MCH 35.6 (H) 26.6 - 33.0 pg    MCHC 37.3 (H) 31.5 - 35.7 g/dL    RDW 12.1 (L) 12.3 - 15.4 %    RDW-SD 42.2 37.0 - 54.0 fl    MPV 9.1 6.0 - 12.0 fL    Platelets 160 140 - 450 10*3/mm3    Neutrophil % 80.6 (H) 42.7 - 76.0 %    Lymphocyte % 9.1 (L) 19.6 - 45.3 %    Monocyte % 9.1 5.0 - 12.0 %    Eosinophil % 0.5 0.3 - 6.2 %    Basophil % 0.1 0.0 - 1.5 %    Immature Grans % 0.6 (H) 0.0 - 0.5 %    Neutrophils, Absolute 6.61  1.70 - 7.00 10*3/mm3    Lymphocytes, Absolute 0.75 0.70 - 3.10 10*3/mm3    Monocytes, Absolute 0.75 0.10 - 0.90 10*3/mm3    Eosinophils, Absolute 0.04 0.00 - 0.40 10*3/mm3    Basophils, Absolute 0.01 0.00 - 0.20 10*3/mm3    Immature Grans, Absolute 0.05 0.00 - 0.05 10*3/mm3    nRBC 0.0 0.0 - 0.2 /100 WBC   Basic Metabolic Panel    Specimen: Blood   Result Value Ref Range    Glucose 92 65 - 99 mg/dL    BUN 20 8 - 23 mg/dL    Creatinine 1.34 (H) 0.76 - 1.27 mg/dL    Sodium 126 (L) 136 - 145 mmol/L    Potassium 2.4 (C) 3.5 - 5.2 mmol/L    Chloride 98 98 - 107 mmol/L    CO2 17.0 (L) 22.0 - 29.0 mmol/L    Calcium 10.8 (H) 8.6 - 10.5 mg/dL    BUN/Creatinine Ratio 14.9 7.0 - 25.0    Anion Gap 11.0 5.0 - 15.0 mmol/L    eGFR 58.8 (L) >60.0 mL/min/1.73   Magnesium    Specimen: Blood   Result Value Ref Range    Magnesium 2.0 1.6 - 2.4 mg/dL   Phosphorus    Specimen: Blood   Result Value Ref Range    Phosphorus 1.7 (C) 2.5 - 4.5 mg/dL   High Sensitivity Troponin T    Specimen: Blood   Result Value Ref Range    HS Troponin T 31 (H) <22 ng/L   Sodium    Specimen: Blood   Result Value Ref Range    Sodium 129 (L) 136 - 145 mmol/L   Phosphorus    Specimen: Blood   Result Value Ref Range    Phosphorus 1.4 (C) 2.5 - 4.5 mg/dL   Basic Metabolic Panel    Specimen: Blood   Result Value Ref Range    Glucose 104 (H) 65 - 99 mg/dL    BUN 14 8 - 23 mg/dL    Creatinine 1.14 0.76 - 1.27 mg/dL    Sodium 129 (L) 136 - 145 mmol/L    Potassium 2.8 (L) 3.5 - 5.2 mmol/L    Chloride 100 98 - 107 mmol/L    CO2 15.0 (L) 22.0 - 29.0 mmol/L    Calcium 10.9 (H) 8.6 - 10.5 mg/dL    BUN/Creatinine Ratio 12.3 7.0 - 25.0    Anion Gap 14.0 5.0 - 15.0 mmol/L    eGFR 71.4 >60.0 mL/min/1.73   Phosphorus    Specimen: Blood   Result Value Ref Range    Phosphorus 2.5 2.5 - 4.5 mg/dL   Hepatitis Panel, Acute    Specimen: Blood   Result Value Ref Range    Hepatitis B Surface Ag Non-Reactive Non-Reactive    Hep A IgM Non-Reactive Non-Reactive    Hep B C IgM  Non-Reactive Non-Reactive    Hepatitis C Ab Non-Reactive Non-Reactive   Basic Metabolic Panel    Specimen: Blood   Result Value Ref Range    Glucose 96 65 - 99 mg/dL    BUN 11 8 - 23 mg/dL    Creatinine 0.98 0.76 - 1.27 mg/dL    Sodium 125 (L) 136 - 145 mmol/L    Potassium 3.1 (L) 3.5 - 5.2 mmol/L    Chloride 97 (L) 98 - 107 mmol/L    CO2 15.0 (L) 22.0 - 29.0 mmol/L    Calcium 10.7 (H) 8.6 - 10.5 mg/dL    BUN/Creatinine Ratio 11.2 7.0 - 25.0    Anion Gap 13.0 5.0 - 15.0 mmol/L    eGFR 85.6 >60.0 mL/min/1.73   Magnesium    Specimen: Blood   Result Value Ref Range    Magnesium 1.8 1.6 - 2.4 mg/dL   Phosphorus    Specimen: Blood   Result Value Ref Range    Phosphorus 2.1 (L) 2.5 - 4.5 mg/dL   CBC Auto Differential    Specimen: Blood   Result Value Ref Range    WBC 8.41 3.40 - 10.80 10*3/mm3    RBC 3.34 (L) 4.14 - 5.80 10*6/mm3    Hemoglobin 11.8 (L) 13.0 - 17.7 g/dL    Hematocrit 32.0 (L) 37.5 - 51.0 %    MCV 95.8 79.0 - 97.0 fL    MCH 35.3 (H) 26.6 - 33.0 pg    MCHC 36.9 (H) 31.5 - 35.7 g/dL    RDW 12.0 (L) 12.3 - 15.4 %    RDW-SD 42.6 37.0 - 54.0 fl    MPV 9.2 6.0 - 12.0 fL    Platelets 158 140 - 450 10*3/mm3    Neutrophil % 78.3 (H) 42.7 - 76.0 %    Lymphocyte % 10.7 (L) 19.6 - 45.3 %    Monocyte % 9.0 5.0 - 12.0 %    Eosinophil % 1.0 0.3 - 6.2 %    Basophil % 0.2 0.0 - 1.5 %    Immature Grans % 0.8 (H) 0.0 - 0.5 %    Neutrophils, Absolute 6.58 1.70 - 7.00 10*3/mm3    Lymphocytes, Absolute 0.90 0.70 - 3.10 10*3/mm3    Monocytes, Absolute 0.76 0.10 - 0.90 10*3/mm3    Eosinophils, Absolute 0.08 0.00 - 0.40 10*3/mm3    Basophils, Absolute 0.02 0.00 - 0.20 10*3/mm3    Immature Grans, Absolute 0.07 (H) 0.00 - 0.05 10*3/mm3    nRBC 0.0 0.0 - 0.2 /100 WBC   Potassium    Specimen: Blood   Result Value Ref Range    Potassium 3.3 (L) 3.5 - 5.2 mmol/L   Phosphorus    Specimen: Blood   Result Value Ref Range    Phosphorus 2.2 (L) 2.5 - 4.5 mg/dL   Sodium    Specimen: Blood   Result Value Ref Range    Sodium 128 (L) 136 - 145  mmol/L   Basic Metabolic Panel    Specimen: Blood   Result Value Ref Range    Glucose 92 65 - 99 mg/dL    BUN 10 8 - 23 mg/dL    Creatinine 1.06 0.76 - 1.27 mg/dL    Sodium 128 (L) 136 - 145 mmol/L    Potassium 3.1 (L) 3.5 - 5.2 mmol/L    Chloride 99 98 - 107 mmol/L    CO2 17.0 (L) 22.0 - 29.0 mmol/L    Calcium 10.9 (H) 8.6 - 10.5 mg/dL    BUN/Creatinine Ratio 9.4 7.0 - 25.0    Anion Gap 12.0 5.0 - 15.0 mmol/L    eGFR 77.9 >60.0 mL/min/1.73   ECG 12 Lead ED Triage Standing Order; Dysrhythmia   Result Value Ref Range    QT Interval 332 ms    QTC Interval 444 ms   ECG 12 Lead Rhythm Change   Result Value Ref Range    QT Interval 372 ms    QTC Interval 447 ms   Green Top (Gel)   Result Value Ref Range    Extra Tube Hold for add-ons.    Lavender Top   Result Value Ref Range    Extra Tube hold for add-on    Gold Top - SST   Result Value Ref Range    Extra Tube Hold for add-ons.    Gray Top   Result Value Ref Range    Extra Tube Hold for add-ons.    Light Blue Top   Result Value Ref Range    Extra Tube Hold for add-ons.         If labs were ordered, I independently reviewed the results and considered them in treating the patient.      EMERGENCY DEPARTMENT COURSE and DIFFERENTIAL DIAGNOSIS/MDM:   Vitals:  AS OF 13:37 EDT    BP - 133/88  HR - 69  TEMP - 98.3 °F (36.8 °C) (Oral)  O2 SATS - 98%        Discussion below represents my analysis of pertinent findings related to patient's condition, differential diagnosis, treatment plan and final disposition.      Differential diagnosis:  The differential diagnosis associated with the patient's presentation includes: gastroparesis, biliary pathology, acute pancreatitis/hepatitis, dehydration, electroltye derangement      Independent interpretations (ECG/rhythm strip/X-ray/US/CT scan): I independently interpreted the pt CXR and cardiac monitor - there is no pulm infiltrate and the pt is in nsr      Patient's care impacted by:   [] Diabetes   [x] Hypertension   [] Coronary Artery  Disease   [] Cancer   [] Other:     Care significantly affected by Social Determinants of Health (housing and economic circumstances, unemployment)    [] Yes     [x] No   If yes, Patient's care significantly limited by  Social Determinants of Health including:    [] Inadequate housing    [] Low income    [] Alcoholism and drug addiction in family    [] Problems related to primary support group    [] Unemployment    [] Problems related to employment    [] Other Social Determinants of Health:       Consideration of admission/observation vs discharge: Pt w significiant hypokalemia, req admission      ED Course:    ED Course as of 03/20/24 1337   Thu Feb 29, 2024   1507 Patient has acute kidney injury, hypokalemia, hyponatremia. Has had several weeks of decreased appetite, nausea, and recurrent vomiting. Has been worked up as an outpatient including endoscopy without any obvious cause. He has no associated abdominal pain. Creatinine today is 1.6, potassium 2.4, sodium 123. Have started IV fluids as well as IV and p.o. potassium. [NS]   1507 Spoke with hospitalist Dr. Doran. Discussed history, presentation, workup. Accepts pt for admission. [NS]      ED Course User Index  [NS] Dakota Murillo MD         CRITICAL CARE TIME    Approximately 35 minutes of discontinuous critical care time was provided to this patient by myself absent of any time spent performing procedures.  Patient presents critically ill with significant hypokalemia placing the cv system at risk requiring the following interventions: IV potassium replacement, interpretation of labs/ecg/imaging, freq reassessment, coordination of admission\.  Patient at high risk of deterioration and possibly death without these interventions.      FINAL IMPRESSION      1. Hypokalemia    2. Hyponatremia    3. Acute kidney injury    4. Tachycardia    5. Hypotension, unspecified hypotension type          DISPOSITION/PLAN     ED Disposition       ED Disposition    Decision to Admit    Condition   --    Comment   Level of Care: Telemetry [5]   Diagnosis: Hyponatremia [198519]   Admitting Physician: EDITH MORALES [[id number]]                   Comment: Please note this report has been produced using speech recognition software.      Dakota Murillo MD  Attending Emergency Physician             Dakota Murillo MD  03/20/24 9713

## 2024-03-01 PROBLEM — E43 SEVERE MALNUTRITION: Status: ACTIVE | Noted: 2024-03-01

## 2024-03-01 LAB
ANION GAP SERPL CALCULATED.3IONS-SCNC: 11 MMOL/L (ref 5–15)
ANION GAP SERPL CALCULATED.3IONS-SCNC: 14 MMOL/L (ref 5–15)
BASOPHILS # BLD AUTO: 0.01 10*3/MM3 (ref 0–0.2)
BASOPHILS NFR BLD AUTO: 0.1 % (ref 0–1.5)
BUN SERPL-MCNC: 14 MG/DL (ref 8–23)
BUN SERPL-MCNC: 20 MG/DL (ref 8–23)
BUN/CREAT SERPL: 12.3 (ref 7–25)
BUN/CREAT SERPL: 14.9 (ref 7–25)
CALCIUM SPEC-SCNC: 10.8 MG/DL (ref 8.6–10.5)
CALCIUM SPEC-SCNC: 10.9 MG/DL (ref 8.6–10.5)
CHLORIDE SERPL-SCNC: 100 MMOL/L (ref 98–107)
CHLORIDE SERPL-SCNC: 98 MMOL/L (ref 98–107)
CO2 SERPL-SCNC: 15 MMOL/L (ref 22–29)
CO2 SERPL-SCNC: 17 MMOL/L (ref 22–29)
CREAT SERPL-MCNC: 1.14 MG/DL (ref 0.76–1.27)
CREAT SERPL-MCNC: 1.34 MG/DL (ref 0.76–1.27)
DEPRECATED RDW RBC AUTO: 42.2 FL (ref 37–54)
EGFRCR SERPLBLD CKD-EPI 2021: 58.8 ML/MIN/1.73
EGFRCR SERPLBLD CKD-EPI 2021: 71.4 ML/MIN/1.73
EOSINOPHIL # BLD AUTO: 0.04 10*3/MM3 (ref 0–0.4)
EOSINOPHIL NFR BLD AUTO: 0.5 % (ref 0.3–6.2)
ERYTHROCYTE [DISTWIDTH] IN BLOOD BY AUTOMATED COUNT: 12.1 % (ref 12.3–15.4)
GLUCOSE SERPL-MCNC: 104 MG/DL (ref 65–99)
GLUCOSE SERPL-MCNC: 92 MG/DL (ref 65–99)
HAV IGM SERPL QL IA: NORMAL
HBV CORE IGM SERPL QL IA: NORMAL
HBV SURFACE AG SERPL QL IA: NORMAL
HCT VFR BLD AUTO: 30 % (ref 37.5–51)
HCV AB SER DONR QL: NORMAL
HGB BLD-MCNC: 11.2 G/DL (ref 13–17.7)
IMM GRANULOCYTES # BLD AUTO: 0.05 10*3/MM3 (ref 0–0.05)
IMM GRANULOCYTES NFR BLD AUTO: 0.6 % (ref 0–0.5)
LYMPHOCYTES # BLD AUTO: 0.75 10*3/MM3 (ref 0.7–3.1)
LYMPHOCYTES NFR BLD AUTO: 9.1 % (ref 19.6–45.3)
MAGNESIUM SERPL-MCNC: 2 MG/DL (ref 1.6–2.4)
MCH RBC QN AUTO: 35.6 PG (ref 26.6–33)
MCHC RBC AUTO-ENTMCNC: 37.3 G/DL (ref 31.5–35.7)
MCV RBC AUTO: 95.2 FL (ref 79–97)
MONOCYTES # BLD AUTO: 0.75 10*3/MM3 (ref 0.1–0.9)
MONOCYTES NFR BLD AUTO: 9.1 % (ref 5–12)
NEUTROPHILS NFR BLD AUTO: 6.61 10*3/MM3 (ref 1.7–7)
NEUTROPHILS NFR BLD AUTO: 80.6 % (ref 42.7–76)
NRBC BLD AUTO-RTO: 0 /100 WBC (ref 0–0.2)
PHOSPHATE SERPL-MCNC: 1.4 MG/DL (ref 2.5–4.5)
PHOSPHATE SERPL-MCNC: 1.5 MG/DL (ref 2.5–4.5)
PHOSPHATE SERPL-MCNC: 1.7 MG/DL (ref 2.5–4.5)
PHOSPHATE SERPL-MCNC: 2.5 MG/DL (ref 2.5–4.5)
PLATELET # BLD AUTO: 160 10*3/MM3 (ref 140–450)
PMV BLD AUTO: 9.1 FL (ref 6–12)
POTASSIUM SERPL-SCNC: 2.4 MMOL/L (ref 3.5–5.2)
POTASSIUM SERPL-SCNC: 2.8 MMOL/L (ref 3.5–5.2)
QT INTERVAL: 372 MS
QTC INTERVAL: 447 MS
RBC # BLD AUTO: 3.15 10*6/MM3 (ref 4.14–5.8)
SODIUM SERPL-SCNC: 126 MMOL/L (ref 136–145)
SODIUM SERPL-SCNC: 129 MMOL/L (ref 136–145)
SODIUM SERPL-SCNC: 129 MMOL/L (ref 136–145)
SODIUM SERPL-SCNC: 130 MMOL/L (ref 136–145)
TROPONIN T SERPL HS-MCNC: 31 NG/L
WBC NRBC COR # BLD AUTO: 8.21 10*3/MM3 (ref 3.4–10.8)

## 2024-03-01 PROCEDURE — 80048 BASIC METABOLIC PNL TOTAL CA: CPT | Performed by: PEDIATRICS

## 2024-03-01 PROCEDURE — 85025 COMPLETE CBC W/AUTO DIFF WBC: CPT | Performed by: NURSE PRACTITIONER

## 2024-03-01 PROCEDURE — 84295 ASSAY OF SERUM SODIUM: CPT | Performed by: NURSE PRACTITIONER

## 2024-03-01 PROCEDURE — 25010000002 THIAMINE PER 100 MG: Performed by: NURSE PRACTITIONER

## 2024-03-01 PROCEDURE — 80074 ACUTE HEPATITIS PANEL: CPT | Performed by: PHYSICIAN ASSISTANT

## 2024-03-01 PROCEDURE — 0 DEXTROSE 5 % SOLUTION: Performed by: NURSE PRACTITIONER

## 2024-03-01 PROCEDURE — 84484 ASSAY OF TROPONIN QUANT: CPT | Performed by: NURSE PRACTITIONER

## 2024-03-01 PROCEDURE — 84100 ASSAY OF PHOSPHORUS: CPT | Performed by: PEDIATRICS

## 2024-03-01 PROCEDURE — 25010000002 HEPARIN (PORCINE) PER 1000 UNITS: Performed by: NURSE PRACTITIONER

## 2024-03-01 PROCEDURE — 84100 ASSAY OF PHOSPHORUS: CPT | Performed by: INTERNAL MEDICINE

## 2024-03-01 PROCEDURE — 83735 ASSAY OF MAGNESIUM: CPT | Performed by: NURSE PRACTITIONER

## 2024-03-01 PROCEDURE — 99232 SBSQ HOSP IP/OBS MODERATE 35: CPT | Performed by: PEDIATRICS

## 2024-03-01 PROCEDURE — 84100 ASSAY OF PHOSPHORUS: CPT | Performed by: EMERGENCY MEDICINE

## 2024-03-01 PROCEDURE — 25010000002 POTASSIUM CHLORIDE 10 MEQ/100ML SOLUTION: Performed by: PEDIATRICS

## 2024-03-01 PROCEDURE — 97161 PT EVAL LOW COMPLEX 20 MIN: CPT

## 2024-03-01 PROCEDURE — 99222 1ST HOSP IP/OBS MODERATE 55: CPT | Performed by: PHYSICIAN ASSISTANT

## 2024-03-01 PROCEDURE — 84295 ASSAY OF SERUM SODIUM: CPT | Performed by: INTERNAL MEDICINE

## 2024-03-01 PROCEDURE — 97530 THERAPEUTIC ACTIVITIES: CPT

## 2024-03-01 PROCEDURE — 63710000001 ONDANSETRON ODT 4 MG TABLET DISPERSIBLE: Performed by: NURSE PRACTITIONER

## 2024-03-01 PROCEDURE — 25810000003 SODIUM CHLORIDE 0.9 % SOLUTION: Performed by: PEDIATRICS

## 2024-03-01 PROCEDURE — 80048 BASIC METABOLIC PNL TOTAL CA: CPT | Performed by: NURSE PRACTITIONER

## 2024-03-01 PROCEDURE — 84100 ASSAY OF PHOSPHORUS: CPT | Performed by: NURSE PRACTITIONER

## 2024-03-01 PROCEDURE — 25010000002 POTASSIUM CHLORIDE 10 MEQ/100ML SOLUTION: Performed by: INTERNAL MEDICINE

## 2024-03-01 RX ORDER — FENTANYL/ROPIVACAINE/NS/PF 2-625MCG/1
15 PLASTIC BAG, INJECTION (ML) EPIDURAL ONCE
Status: COMPLETED | OUTPATIENT
Start: 2024-03-01 | End: 2024-03-01

## 2024-03-01 RX ORDER — POTASSIUM CHLORIDE 7.45 MG/ML
10 INJECTION INTRAVENOUS
Qty: 600 ML | Refills: 0 | Status: COMPLETED | OUTPATIENT
Start: 2024-03-01 | End: 2024-03-02

## 2024-03-01 RX ORDER — CETIRIZINE HYDROCHLORIDE 10 MG/1
10 TABLET ORAL NIGHTLY
Status: DISCONTINUED | OUTPATIENT
Start: 2024-03-01 | End: 2024-03-02 | Stop reason: HOSPADM

## 2024-03-01 RX ORDER — POTASSIUM CHLORIDE 7.45 MG/ML
10 INJECTION INTRAVENOUS
Status: COMPLETED | OUTPATIENT
Start: 2024-03-01 | End: 2024-03-01

## 2024-03-01 RX ADMIN — THIAMINE HYDROCHLORIDE 200 MG: 100 INJECTION, SOLUTION INTRAMUSCULAR; INTRAVENOUS at 21:02

## 2024-03-01 RX ADMIN — POTASSIUM CHLORIDE 10 MEQ: 7.46 INJECTION, SOLUTION INTRAVENOUS at 09:44

## 2024-03-01 RX ADMIN — ONDANSETRON 4 MG: 4 TABLET, ORALLY DISINTEGRATING ORAL at 19:38

## 2024-03-01 RX ADMIN — THIAMINE HYDROCHLORIDE 200 MG: 100 INJECTION, SOLUTION INTRAMUSCULAR; INTRAVENOUS at 14:01

## 2024-03-01 RX ADMIN — PANTOPRAZOLE SODIUM 40 MG: 40 TABLET, DELAYED RELEASE ORAL at 05:13

## 2024-03-01 RX ADMIN — ONDANSETRON 4 MG: 4 TABLET, ORALLY DISINTEGRATING ORAL at 05:46

## 2024-03-01 RX ADMIN — THIAMINE HYDROCHLORIDE 200 MG: 100 INJECTION, SOLUTION INTRAMUSCULAR; INTRAVENOUS at 05:13

## 2024-03-01 RX ADMIN — HEPARIN SODIUM 5000 UNITS: 5000 INJECTION INTRAVENOUS; SUBCUTANEOUS at 05:13

## 2024-03-01 RX ADMIN — POTASSIUM CHLORIDE 10 MEQ: 7.46 INJECTION, SOLUTION INTRAVENOUS at 11:53

## 2024-03-01 RX ADMIN — HEPARIN SODIUM 5000 UNITS: 5000 INJECTION INTRAVENOUS; SUBCUTANEOUS at 14:01

## 2024-03-01 RX ADMIN — POTASSIUM CHLORIDE 10 MEQ: 7.46 INJECTION, SOLUTION INTRAVENOUS at 06:47

## 2024-03-01 RX ADMIN — POTASSIUM CHLORIDE 10 MEQ: 7.46 INJECTION, SOLUTION INTRAVENOUS at 22:54

## 2024-03-01 RX ADMIN — DEXTROSE MONOHYDRATE 75 ML/HR: 50 INJECTION, SOLUTION INTRAVENOUS at 13:00

## 2024-03-01 RX ADMIN — ALPRAZOLAM 0.12 MG: 0.25 TABLET ORAL at 22:54

## 2024-03-01 RX ADMIN — POTASSIUM CHLORIDE 10 MEQ: 7.46 INJECTION, SOLUTION INTRAVENOUS at 08:33

## 2024-03-01 RX ADMIN — POTASSIUM & SODIUM PHOSPHATES POWDER PACK 280-160-250 MG 2 PACKET: 280-160-250 PACK at 05:14

## 2024-03-01 RX ADMIN — POTASSIUM CHLORIDE 10 MEQ: 7.46 INJECTION, SOLUTION INTRAVENOUS at 10:50

## 2024-03-01 RX ADMIN — Medication 10 ML: at 20:59

## 2024-03-01 RX ADMIN — HEPARIN SODIUM 5000 UNITS: 5000 INJECTION INTRAVENOUS; SUBCUTANEOUS at 21:02

## 2024-03-01 RX ADMIN — PRAVASTATIN SODIUM 40 MG: 40 TABLET ORAL at 20:59

## 2024-03-01 RX ADMIN — POTASSIUM PHOSPHATE, MONOBASIC POTASSIUM PHOSPHATE, DIBASIC 15 MMOL: 224; 236 INJECTION, SOLUTION, CONCENTRATE INTRAVENOUS at 13:00

## 2024-03-01 RX ADMIN — FOLIC ACID 1 MG: 1 TABLET ORAL at 08:34

## 2024-03-01 RX ADMIN — CETIRIZINE HYDROCHLORIDE 10 MG: 10 TABLET, FILM COATED ORAL at 22:54

## 2024-03-01 RX ADMIN — POTASSIUM CHLORIDE 10 MEQ: 7.46 INJECTION, SOLUTION INTRAVENOUS at 05:47

## 2024-03-01 NOTE — PLAN OF CARE
Goal Outcome Evaluation:  Plan of Care Reviewed With: patient           Outcome Evaluation: PT eval complete. Pt demo balance deficits and decreased activity tolerance leading to impairments in functional mobility below baseline. Pt completing side steps to chair w/ FWW, CGA; further mobility limited by N/V and lightheadedness. Pt will benefit from skilled IP PT to address impairments and return to PLOF. PT rec IRF at d/c pending progress.      Anticipated Discharge Disposition (PT): inpatient rehabilitation facility

## 2024-03-01 NOTE — CASE MANAGEMENT/SOCIAL WORK
"Continued Stay Note  [facility]     Patient Name: Campos Alba  MRN: [id number]  Today's Date: 3/1/2024    Admit Date: 2/29/2024    Plan: Declined AUD Resources   Discharge Plan       Row Name 03/01/24 1413       Plan    Plan Declined AUD Resources    Plan Comments Spoke to patient at bedside. When asked about his drinking patient stated, \"I just drink a few beers and bourbon every now and then\".     Patient then went on to talk about his Alprazolam prescription and claimed \"the pharmacy just keeps filling it, I have bottles of it at home\". He went on to say, \"I am not a drug addict\".    It appears that the patient is in denial of the effects alcohol is having on his medical conditions and has no desire to stop drinking at this time.    Encouraged patient to reach out to the Addiction Medicine Team ([phone]) at any time, including post discharge, if he decides he is interested in assistance and support.                    Discharge Codes    No documentation.                 Expected Discharge Date and Time       Expected Discharge Date Expected Discharge Time    Mar 2, 2024               Raiza Mccarthy   Addiction Treatment Navigator  Ext. [phone]   "

## 2024-03-01 NOTE — THERAPY EVALUATION
Patient Name: Campos Alba  : 1960    MRN: [id number]                              Today's Date: 3/1/2024       Admit Date: 2024    Visit Dx:     ICD-10-CM ICD-9-CM   1. Hypokalemia  E87.6 276.8   2. Hyponatremia  E87.1 276.1   3. Acute kidney injury  N17.9 584.9   4. Tachycardia  R00.0 785.0   5. Hypotension, unspecified hypotension type  I95.9 458.9     Patient Active Problem List   Diagnosis    Hyponatremia    Hypokalemia    Nausea    Dizziness    Weight loss    ZAIRE (acute kidney injury)     Past Medical History:   Diagnosis Date    HTN (hypertension)     Hyperlipidemia      Past Surgical History:   Procedure Laterality Date    ANKLE SURGERY Left 2006    TOTAL HIP ARTHROPLASTY Right 2012    WISDOM TOOTH EXTRACTION        General Information       Row Name 24 0941          Physical Therapy Time and Intention    Document Type evaluation  -KE     Mode of Treatment physical therapy  -KE       Row Name 24 0941          General Information    Patient Profile Reviewed yes  -KE     Prior Level of Function independent:;all household mobility;ADL's;cleaning;driving  Ind w/ SPC for household mobility, limited community ambulator. Owns FWW  -KE     Existing Precautions/Restrictions fall  -KE     Barriers to Rehab medically complex  -KE       Row Name 24 0941          Living Environment    People in Home alone  -KE       Row Name 24 0941          Home Main Entrance    Number of Stairs, Main Entrance three  -KE     Stair Railings, Main Entrance none  -KE       Row Name 24 0941          Stairs Within Home, Primary    Stairs, Within Home, Primary flight to second level, not req to navigate  -KE     Number of Stairs, Within Home, Primary twelve  -KE       Row Name 24 0941          Cognition    Orientation Status (Cognition) oriented x 4  -KE       Row Name 24 0941          Safety Issues, Functional Mobility    Safety Issues Affecting Function (Mobility) awareness of  need for assistance;insight into deficits/self-awareness;safety precaution awareness;safety precautions follow-through/compliance  -KE     Impairments Affecting Function (Mobility) balance;endurance/activity tolerance;strength;pain  -KE               User Key  (r) = Recorded By, (t) = Taken By, (c) = Cosigned By      Initials Name Provider Type    Gabriela Stewart PT Physical Therapist                   Mobility       Row Name 03/01/24 0944          Bed Mobility    Bed Mobility supine-sit  -KE     Supine-Sit [place] (Bed Mobility) standby assist  -KE     Assistive Device (Bed Mobility) bed rails;head of bed elevated  -KE     Comment, (Bed Mobility) Pt req increased time and effort to complete, demo good sequencing  -KE       Row Name 03/01/24 0944          Transfers    Comment, (Transfers) x3 STS from EOB  -KE       Row Name 03/01/24 0944          Sit-Stand Transfer    Sit-Stand [place] (Transfers) contact guard  -KE     Assistive Device (Sit-Stand Transfers) walker, front-wheeled  -KE     Comment, (Sit-Stand Transfer) pt req use of momentum to initiate stand, VCs for UE placement  -KE       Row Name 03/01/24 0944          Gait/Stairs (Locomotion)    [place] Level (Gait) contact guard  -KE     Assistive Device (Gait) walker, front-wheeled  -KE     Distance in Feet (Gait) 3  -KE     Deviations/Abnormal Patterns (Gait) base of support, wide;gait speed decreased;stride length decreased;bilateral deviations  -KE     Bilateral Gait Deviations forward flexed posture  -KE     Comment, (Gait/Stairs) Pt taking 6 side steps to chair. Demo wide VIRAJ with decreased step length. Demo poor sequencing of FWW. Further mobility limited by N/V and c/o lightheadedness.  -KE               User Key  (r) = Recorded By, (t) = Taken By, (c) = Cosigned By      Initials Name Provider Type    Gabriela Stewart PT Physical Therapist                   Obj/Interventions       Row Name 03/01/24 0946          Range of Motion  Comprehensive    General Range of Motion bilateral lower extremity ROM WFL  -       Row Name 03/01/24 0946          Strength Comprehensive (MMT)    General Manual Muscle Testing (MMT) Assessment lower extremity strength deficits identified  -KE     Comment, General Manual Muscle Testing (MMT) Assessment B LEs 4/5 based on functional mobility  -       Row Name 03/01/24 0946          Balance    Balance Assessment sitting static balance;sit to stand dynamic balance;sitting dynamic balance;standing static balance;standing dynamic balance  -KE     Static Sitting Balance supervision  -KE     Dynamic Sitting Balance supervision  -KE     Position, Sitting Balance unsupported;sitting edge of bed  -KE     Sit to Stand Dynamic Balance contact guard  -KE     Static Standing Balance contact guard  -KE     Dynamic Standing Balance contact guard  -KE     Position/Device Used, Standing Balance walker, front-wheeled  -KE     Balance Interventions sitting;standing;sit to stand;supported;static;dynamic  -KE     Comment, Balance no overt LOB noted  -KE               User Key  (r) = Recorded By, (t) = Taken By, (c) = Cosigned By      Initials Name Provider Type    Gabriela Stewart, PT Physical Therapist                   Goals/Plan       Row Name 03/01/24 0912          Bed Mobility Goal 1 (PT)    Activity/Assistive Device (Bed Mobility Goal 1, PT) sit to supine/supine to sit  -KE     [place] Level/Cues Needed (Bed Mobility Goal 1, PT) independent  -KE     Time Frame (Bed Mobility Goal 1, PT) long term goal (LTG);10 days  -KE     Progress/Outcomes (Bed Mobility Goal 1, PT) new goal  -       Row Name 03/01/24 0952          Transfer Goal 1 (PT)    Activity/Assistive Device (Transfer Goal 1, PT) sit-to-stand/stand-to-sit;bed-to-chair/chair-to-bed  -KE     [place] Level/Cues Needed (Transfer Goal 1, PT) independent  -KE     Time Frame (Transfer Goal 1, PT) long term goal (LTG);10 days  -KE     Progress/Outcome (Transfer  Goal 1, PT) new goal  -KE       Row Name 03/01/24 0952          Gait Training Goal 1 (PT)    Activity/Assistive Device (Gait Training Goal 1, PT) gait (walking locomotion);assistive device use  -KE     [place] Level (Gait Training Goal 1, PT) standby assist  -KE     Distance (Gait Training Goal 1, PT) 150'  -KE     Time Frame (Gait Training Goal 1, PT) long term goal (LTG);10 days  -KE     Progress/Outcome (Gait Training Goal 1, PT) new goal  -KE       Row Name 03/01/24 0952          Stairs Goal 1 (PT)    Activity/Assistive Device (Stairs Goal 1, PT) stairs, all skills  -KE     [place] Level/Cues Needed (Stairs Goal 1, PT) standby assist  -KE     Number of Stairs (Stairs Goal 1, PT) 3  -KE     Time Frame (Stairs Goal 1, PT) long term goal (LTG);10 days  -KE     Progress/Outcome (Stairs Goal 1, PT) new goal  -KE       Row Name 03/01/24 0952          Therapy Assessment/Plan (PT)    Planned Therapy Interventions (PT) balance training;bed mobility training;gait training;home exercise program;patient/family education;postural re-education;neuromuscular re-education;ROM (range of motion);stair training;strengthening;stretching;transfer training  -               User Key  (r) = Recorded By, (t) = Taken By, (c) = Cosigned By      Initials Name Provider Type    Gabriela Stewart, PT Physical Therapist                   Clinical Impression       Row Name 03/01/24 0949          Pain    Pretreatment Pain Rating 0/10 - no pain  -KE     Posttreatment Pain Rating 0/10 - no pain  -KE     Pain Intervention(s) Ambulation/increased activity;Repositioned  -KE       Row Name 03/01/24 0949          Plan of Care Review    Plan of Care Reviewed With patient  -KE     Outcome Evaluation PT eval complete. Pt demo balance deficits and decreased activity tolerance leading to impairments in functional mobility below baseline. Pt completing side steps to chair w/ FWW, CGA; further mobility limited by N/V and lightheadedness. Pt will  benefit from skilled IP PT to address impairments and return to PLOF. PT rec IRF at d/c pending progress.  -       Row Name 03/01/24 0949          Therapy Assessment/Plan (PT)    Rehab Potential (PT) good, to achieve stated therapy goals  -     Criteria for Skilled Interventions Met (PT) yes;meets criteria;skilled treatment is necessary  -KE     Therapy Frequency (PT) daily  -KE       Row Name 03/01/24 0949          Vital Signs    Pre Systolic BP Rehab 110  -KE     Pre Treatment Diastolic BP 78  -KE     Intra Systolic BP Rehab --  supine: 132/86; sitting 129/69; standing 123/78  -KE     Post Systolic BP Rehab 115  -KE     Post Treatment Diastolic BP 89  -KE     Pretreatment Heart Rate (beats/min) 91  -KE     Intratreatment Heart Rate (beats/min) 122  -KE     Posttreatment Heart Rate (beats/min) 100  -KE     Pre SpO2 (%) 97  -KE     O2 Delivery Pre Treatment room air  -KE     O2 Delivery Intra Treatment room air  -KE     Post SpO2 (%) 94  -KE     O2 Delivery Post Treatment room air  -KE     Pre Patient Position Supine  -KE     Intra Patient Position Standing  -KE     Post Patient Position Sitting  -KE       Row Name 03/01/24 0949          Positioning and Restraints    Pre-Treatment Position in bed  -KE     Post Treatment Position chair  -KE     In Chair notified nsg;reclined;call light within reach;encouraged to call for assist;exit alarm on;waffle cushion  -               User Key  (r) = Recorded By, (t) = Taken By, (c) = Cosigned By      Initials Name Provider Type    Gabriela Stewart, PT Physical Therapist                   Outcome Measures       Row Name 03/01/24 0954 03/01/24 0825       How much help from another person do you currently need...    Turning from your back to your side while in flat bed without using bedrails? 4  -KE 4  -SB    Moving from lying on back to sitting on the side of a flat bed without bedrails? 3  -KE 3  -SB    Moving to and from a bed to a chair (including a wheelchair)? 3   -KE 3  -SB    Standing up from a chair using your arms (e.g., wheelchair, bedside chair)? 3  -KE 3  -SB    Climbing 3-5 steps with a railing? 2  -KE 2  -SB    To walk in hospital room? 3  -KE 3  -SB    AM-PAC 6 Clicks Score (PT) 18  -KE 18  -SB    Highest Level of Mobility Goal 6 --> Walk 10 steps or more  -KE 6 --> Walk 10 steps or more  -SB      Row Name 03/01/24 0954          Functional Assessment    Outcome Measure Options AM-PAC 6 Clicks Basic Mobility (PT)  -SATISH               User Key  (r) = Recorded By, (t) = Taken By, (c) = Cosigned By      Initials Name Provider Type    SB Anthony Jerome, RN Registered Nurse    Gabriela Stewart PT Physical Therapist                                 Physical Therapy Education       Title: PT OT SLP Therapies (In Progress)       Topic: Physical Therapy (In Progress)       Point: Mobility training (In Progress)       Learning Progress Summary             Patient Acceptance, E, NR by SATISH at 3/1/2024 0844                         Point: Home exercise program (Not Started)       Learner Progress:  Not documented in this visit.              Point: Body mechanics (In Progress)       Learning Progress Summary             Patient Acceptance, E, NR by SATISH at 3/1/2024 0844                         Point: Precautions (In Progress)       Learning Progress Summary             Patient Acceptance, E, NR by SATISH at 3/1/2024 0844                                         User Key       Initials Effective Dates Name Provider Type Discipline    SATISH 11/16/23 -  Gabriela Craft, JAIMIE Physical Therapist PT                  PT Recommendation and Plan  Planned Therapy Interventions (PT): balance training, bed mobility training, gait training, home exercise program, patient/family education, postural re-education, neuromuscular re-education, ROM (range of motion), stair training, strengthening, stretching, transfer training  Plan of Care Reviewed With: patient  Outcome Evaluation: PT eval complete. Pt demo  balance deficits and decreased activity tolerance leading to impairments in functional mobility below baseline. Pt completing side steps to chair w/ FWW, CGA; further mobility limited by N/V and lightheadedness. Pt will benefit from skilled IP PT to address impairments and return to PLOF. PT rec IRF at d/c pending progress.     Time Calculation:   PT Evaluation Complexity  History, PT Evaluation Complexity: 1-2 personal factors and/or comorbidities  Examination of Body Systems (PT Eval Complexity): 1-2 elements  Clinical Presentation (PT Evaluation Complexity): stable  Clinical Decision Making (PT Evaluation Complexity): low complexity  Overall Complexity (PT Evaluation Complexity): low complexity     PT Charges       Row Name 03/01/24 0955             Time Calculation    Start Time 0844  -KE      PT Received On 03/01/24  -KE      PT Goal Re-Cert Due Date 03/11/24  -KE         Timed Charges    52939 - PT Therapeutic Activity Minutes 10  -KE         Untimed Charges    PT Eval/Re-eval Minutes 51  -KE         Total Minutes    Timed Charges Total Minutes 10  -KE      Untimed Charges Total Minutes 51  -KE       Total Minutes 61  -KE                User Key  (r) = Recorded By, (t) = Taken By, (c) = Cosigned By      Initials Name Provider Type    Gabriela Stewart PT Physical Therapist                  Therapy Charges for Today       Code Description Service Date Service Provider Modifiers Qty    82848156355 HC PT THERAPEUTIC ACT EA 15 MIN 3/1/2024 Gabriela Craft, PT GP 1    93014568943 HC PT EVAL LOW COMPLEXITY 4 3/1/2024 Gabriela Craft PT GP 1            PT G-Codes  Outcome Measure Options: AM-PAC 6 Clicks Basic Mobility (PT)  AM-PAC 6 Clicks Score (PT): 18  PT Discharge Summary  Anticipated Discharge Disposition (PT): inpatient rehabilitation facility    Gabriela Craft PT  3/1/2024

## 2024-03-01 NOTE — PLAN OF CARE
Goal Outcome Evaluation:         Patient is on RA, NSR, Aox4, no signs of withdrawal during shift, continued IV electrolyte replacement, wasn't nauseous this afternoon, still very little appetite only drank boost supplements today, continued IVFs (D5W at 75), VSS - will continue POC.

## 2024-03-01 NOTE — CASE MANAGEMENT/SOCIAL WORK
"Continued Stay Note  [facility]     Patient Name: Campos Alba  MRN: [id number]  Today's Date: 3/1/2024    Admit Date: 2/29/2024    Plan: Signing off   Discharge Plan       Row Name 03/01/24 1428       Plan    Plan Signing off    Plan Comments Consult received- thank you.  HPI:  63 y.o. male with PMH of HTN, HLP who presented to the ED at the request of his PCP for EKG changes.  He denies chest pain.  His primary complaint in nausea, dizziness and unintentional 50lb weight loss since October.  These symptoms have waxed and waned since October, but he feels they have gotten worse over the last week.  He was found to have multiple electrolyte abnormalities.      Admission labs:  BAL none obtained  UDS none obtained  LFT's- AST/ALT/Total bili= 54/119/0.7  K 2.4  Na 123  PO 4 1.6  CIWAs range today: 0 Highest CIWA thus far= 3      The patient has been assessed by our team and he does not wish to engage in conversations regarding his ETOH use.      States that he \"has a few beers and bourbons every night\"  No ETOH for a week.    He does not feel that his ETOH is a contributory factor to any of his health detriments.      I am unable to  regarding the hyponatremic effects of continued ETOH use.    Nothing further to add from an addiction perspective.        Row Name 03/01/24 1413       Plan    Plan Declined AUD Resources    Plan Comments Spoke to patient at bedside.                   Discharge Codes    No documentation.                 Expected Discharge Date and Time       Expected Discharge Date Expected Discharge Time    Mar 2, 2024               Sarah Crowe RN MA,BSN-  Addiction Medicine     "

## 2024-03-01 NOTE — CASE MANAGEMENT/SOCIAL WORK
Discharge Planning Assessment  [facility]     Patient Name: Campos Alba  MRN: [id number]  Today's Date: 3/1/2024    Admit Date: 2/29/2024    Plan: Home   Discharge Needs Assessment       Row Name 03/01/24 1616       Living Environment    People in Home alone    Current Living Arrangements home    Potentially Unsafe Housing Conditions none    Primary Care Provided by self       Transition Planning    Patient/Family Anticipates Transition to home       Discharge Needs Assessment    Equipment Currently Used at Home none                   Discharge Plan       Row Name 03/01/24 1616       Plan    Plan Home    Patient/Family in Agreement with Plan yes    Plan Comments Spoke with patient at bedside. Patient lives alone in [facility]. He is independent of ADL's. He has a walker and cane that he uses if needed. He is not current with  services. PCP is Jc Mann.  Insurance is [employer]. Patient discharge plan is home with a friend to transport. There was no discharge need identifed at this [facility]. CM will follow.    Final Discharge Disposition Code 01 - home or self-care      Row Name 03/01/24 1428       Plan    Plan Signing off    Plan Comments The patient has been assessed by our team and he does not wish to engage in conversations regarding his ETOH use.  He does not feel that his ETOH is a contributory factor to any of his health detriments.  I am unable to  regarding the hypnatremic effects of continued ETOH use.      Row Name 03/01/24 1413       Plan    Plan Declined AUD Resources    Plan Comments Spoke to patient at bedside.                  Continued Care and Services - Admitted Since 2/29/2024    Coordination has not been started for this encounter.       Expected Discharge Date and Time       Expected Discharge Date Expected Discharge Time    Mar 2, 2024            Demographic Summary       Row Name 03/01/24 1615       General Information    Admission Type inpatient    Preferred Language  English                   Functional Status       Row Name 03/01/24 1615       Functional Status    Usual Activity Tolerance moderate    Current Activity Tolerance moderate       Functional Status, IADL    Medications independent    Meal Preparation independent    Housekeeping independent    Laundry independent    Shopping independent                   Psychosocial    No documentation.                  Abuse/Neglect    No documentation.                  Legal    No documentation.                  Substance Abuse    No documentation.                  Patient Forms    No documentation.                     Sharona Marshall RN

## 2024-03-01 NOTE — PAYOR COMM NOTE
"Campos Alba \"Ed\" (63 y.o. Male)     [id number]     Mia Tenorio RN  Utilization Review  [phone]  [phone]        Date of Birth   1960    Social Security Number       Address   [address] [address]    Home Phone   [phone]    MRN   [id number]       Episcopal   None    Marital Status                               Admission Date   24    Admission Type   Emergency    Admitting Provider   Abril Quigley MD    Attending Provider   Abril Quigley MD    Department, Room/Bed   [facility] 6B, N634/1       Discharge Date       Discharge Disposition       Discharge Destination                                 Attending Provider: Abril Quigley MD    Allergies: No Known Allergies    Isolation: None   Infection: None   Code Status: No CPR    Ht: 182.9 cm (72\")   Wt: 107 kg (235 lb 14.3 oz)    Admission Cmt: None   Principal Problem: Hyponatremia [E87.1]                   Active Insurance as of 2024       Primary Coverage       Payor Plan Insurance Group Employer/Plan Group    ANTHEM MEDICARE REPLACEMENT ANTHEM MEDICARE ADVANTAGE [id number]       Payor Plan Address Payor Plan Phone Number Payor Plan Fax Number Effective Dates    [address] [phone]  2024 - None Entered    [facility] [id number]         Subscriber Name Subscriber Birth Date Member ID       CAMPOS ALBA 1960 [id number]                     Emergency Contacts        (Rel.) Home Phone Work Phone Mobile Phone    LEONGLA (Friend) [phone] -- --                 History & Physical        Leonardo Doran MD at 24 [address] [place] [address] Medicine Services  HISTORY AND PHYSICAL    Patient Name: Campos Alba  : 1960  MRN: [id number]  Primary Care Physician: Kelby Mann MD  Date of admission: 2024    Subjective  Subjective     Chief " Complaint:  Dizziness, Nausea, weight loss    HPI:  Campos Alba is a 63 y.o. male with PMH of HTN, HLP who presented to the ED at the request of his PCP for EKG changes.  He denies chest pain.  His primary complaint in nausea, dizziness and unintentional 50lb weight loss since October.  These symptoms have waxed and waned since October, but he feels they have gotten worse over the last week.  He was found to have multiple electrolyte abnormalities and is being admitted to the hospitalist service for further treatment.        Review of Systems   Constitutional:  Positive for appetite change and fatigue. Negative for fever.   HENT:  Negative for congestion and hearing loss.    Eyes:  Negative for visual disturbance.   Respiratory:  Positive for shortness of breath. Negative for chest tightness.    Cardiovascular:  Negative for chest pain and palpitations.   Gastrointestinal:  Positive for nausea and vomiting. Negative for abdominal pain, constipation and diarrhea.   Genitourinary:  Negative for dysuria and hematuria.   Musculoskeletal:  Negative for back pain.   Neurological:  Positive for dizziness and weakness.   Psychiatric/Behavioral:  Negative for behavioral problems and confusion.         Personal History     Past Medical History:   Diagnosis Date    HTN (hypertension)     Hyperlipidemia        Past Surgical History:   Procedure Laterality Date    ANKLE SURGERY Left 2006    TOTAL HIP ARTHROPLASTY Right 2012    WISDOM TOOTH EXTRACTION         Family History: family history includes Alzheimer's disease in his father; Arthritis in his father; Hypertension in his father; No Known Problems in his mother.     Social History:  reports that he has never smoked. He does not have any smokeless tobacco history on file. He reports current alcohol use. He reports that he does not use drugs.  Social History     Social History Narrative    Lives alone in [place]    Not active with HH    Retired         Medications:  ALPRAZolam, clotrimazole, omeprazole, ondansetron ODT, pravastatin, and prazosin    No Known Allergies    Objective  Objective     Vital Signs:   Temp:  [98.2 °F (36.8 °C)] 98.2 °F (36.8 °C)  Heart Rate:  [] 86  Resp:  [14] 14  BP: ()/(54-91) 125/86    Physical Exam   Constitutional: Awake, alert, friends at bedside  Eyes: PERRLA, sclerae anicteric, no conjunctival injection  HENT: NCAT, mucous membranes moist  Neck: Supple, no thyromegaly, no lymphadenopathy, trachea midline  Respiratory: Clear to auscultation bilaterally, nonlabored respirations   Cardiovascular: RRR, no murmurs, rubs, or gallops, palpable pedal pulses bilaterally  Gastrointestinal: Positive bowel sounds, soft, nontender, nondistended  Musculoskeletal: No bilateral ankle edema, no clubbing or cyanosis to extremities  Psychiatric: Appropriate affect, cooperative  Neurologic: Oriented x 3, KUMAR, speech clear  Skin: No rashes noted    Result Review:  I have personally reviewed the results from the time of this admission to 2/29/2024 17:37 EST and agree with these findings:  [x]  Laboratory list / accordion  []  Microbiology  []  Radiology  [x]  EKG/Telemetry   []  Cardiology/Vascular   []  Pathology  [x]  Old records  []  Other:      LAB RESULTS:      Lab 02/29/24  1300   WBC 15.20*   HEMOGLOBIN 13.4   HEMATOCRIT 36.9*   PLATELETS 222   NEUTROS ABS 12.71*   IMMATURE GRANS (ABS) 0.11*   LYMPHS ABS 0.82   MONOS ABS 1.47*   EOS ABS 0.06   MCV 97.9*         Lab 02/29/24  1300   SODIUM 123*   POTASSIUM 2.4*   CHLORIDE 89*   CO2 19.0*   ANION GAP 15.0   BUN 21   CREATININE 1.64*   EGFR 46.1*   GLUCOSE 120*   CALCIUM 12.0*   MAGNESIUM 2.2   PHOSPHORUS 1.6*   TSH 2.190         Lab 02/29/24  1300   TOTAL PROTEIN 7.2   ALBUMIN 4.3   GLOBULIN 2.9   ALT (SGPT) 119*   AST (SGOT) 54*   BILIRUBIN 0.7   ALK PHOS 110         Lab 02/29/24  1300   PROBNP 194.9   HSTROP T 34*           Brief Urine Lab Results       None           Microbiology Results (last 10 days)       ** No results found for the last 240 hours. **            XR Chest 1 View    Result Date: 2/29/2024  XR CHEST 1 VW Date of Exam: 2/29/2024 12:14 PM EST Indication: Dysrhythmia triage protocol Comparison: 8/31/2021 Findings: The lungs appear adequately aerated without consolidation or mass. No pleural effusion or pneumothorax is identified. The cardiomediastinal silhouette and pulmonary vasculature appear within normal limits. No acute or suspicious osseous lesion is identified.     Impression: Impression: 1.No acute radiographic abnormality is identified. Electronically Signed: Vitaliy Smith MD  2/29/2024 12:43 PM EST  Workstation ID: [id number]         Assessment & Plan  Assessment & Plan       Hyponatremia    Hypokalemia    Nausea    Dizziness    Weight loss    ZAIRE (acute kidney injury)    Nausea  Unintentional weight loss (50lbs since October)  Poor appetite  --GI consult  --EGD in Nov 23 which was reportedly negative  --uses CBD gummies    Dizziness  --check CT head with contrast 3/1/24 if creatinine improved  --questionable med related + dehydration  --check orthostatics    Hyponatremia  Hypokalemia  Hypophosphatemia  --has had 2L saline in ED, will recheck Na before ordering more IVF  --replace per protocol  --recheck labs in am, Na check q4h overnight    ZAIRE  --recheck in am after IVF    ETOH use   --drinks on a daily basis (few beers + bourbon)  --states his last drink was a week ago  --continue home xanax  --withdrawal protocol  --addiction team to see  --low Na may be his baseline with ETOH use    EKG changes  --troponin 34, will trend  --possible ST depression on 1st EKG in ED  --denies chest pain, continue to monitor    Recent Balanitis  --follows with Dr. Simmons  --office notes mention possible need for biopsy    Total time spent: 60 minutes  Time spent includes time reviewing chart, face-to-face time, counseling patient/family/caregiver, ordering  medications/tests/procedures, communicating with other health care professionals, documenting clinical information in the electronic health record, and coordination of care.      DVT prophylaxis:  Lovenox    CODE STATUS:    Medical Intervention Limits: NO intubation (DNI)  Level Of Support Discussed With: Patient  Code Status (Patient has no pulse and is not breathing): No CPR (Do Not Attempt to Resuscitate)  Medical Interventions (Patient has pulse or is breathing): Limited Support      Expected Discharge  Expected Discharge Date: 3/2/2024; Expected Discharge Time:       This note has been completed as part of a split-shared workflow.     Signature: Electronically signed by CHIKA Arevalo, 02/29/24, 5:37 PM EST    Patient seen and examined at the bedside.  Patient is a 63-year-old with past medical history significant for hypertension, hyperlipidemia, AAA drinks almost every day.  Evidently patient has had nausea since last October.  He tells me that it started suddenly but it has continued and it has worsened.  He tells me that the severity of it actually fluctuates a little bit.  That has affected his appetite in a major way and it seems that he has lost quite a bit of weight for the past several months.  Patient denies vomiting except a few episodes.  Patient has been referred to gastroenterology by his PCP and patient has seen Dr. Sethi.  He tells me that Dr. Sethi has done an upper endoscopy.  Patient tells me that the endoscopy revealed gastritis and there was some suspicion of celiac disease also.  Patient is being admitted for severe nausea and inability to have adequate oral intake.  Patient also tells me that since the onset of nausea he has noticed that gradually his memory has weakened.  Lately he also has had dizziness and fatigue.  Denies any fever or chills.  No chest pain or palpitation.    Constitutional: No acute distress, awake, alert  HENT: NCAT, mucous membranes moist  Respiratory: Clear  to auscultation bilaterally, respiratory effort normal   Cardiovascular: RRR, no murmurs, rubs, or gallops  Gastrointestinal: Positive bowel sounds, soft, nontender, nondistended  Musculoskeletal: No bilateral ankle edema  Psychiatric: Appropriate affect, cooperative  Neurologic: Oriented x 3, strength symmetric in all extremities, Cranial Nerves grossly intact to confrontation, speech clear  Skin: No rashes     Assessment and plan:  63-year-old male with past medical history of hypertension, hyperlipidemia.  He drinks regularly.  He also uses CBD Gummies.  He has had nausea since last October which has gradually gotten worse.  Lately he has had dizziness, fatigue, difficulty with memory also.    Evaluation at the emergency room also shows elevated creatinine at 2.4, hyponatremia, hypophosphatemia. Will admit the patient to telemetry for observation.   recommend imaging of the brain either by CT with contrast been patient's renal function allows or MRI without contrast.  Total time spent was 55 minutes.        Electronically signed by Leonardo Doran MD at 03/01/24 0027          Emergency Department Notes        Lisa Figueroa RN at 02/29/24 1656           Edward Asael Alba    Nursing Report ED to Floor:  Mental status: AO4  Ambulatory status: X1  Oxygen Therapy:  RA  Cardiac Rhythm: SINUS WITH 1ST DEGREE BLOCK  Admitted from: ED  Safety Concerns:  FALL RISK  Social Issues: NONE  ED Room #:  12    ED Nurse Phone Extension - [phone] or may call [phone].      HPI:   Chief Complaint   Patient presents with    Rapid Heart Rate       Past Medical History:  Past Medical History:   Diagnosis Date    HTN (hypertension)     Hyperlipidemia         Past Surgical History:  Past Surgical History:   Procedure Laterality Date    ANKLE SURGERY Left 2006    TOTAL HIP ARTHROPLASTY Right 2012    WISDOM TOOTH EXTRACTION          Admitting Doctor:   Leonardo Doran MD    Consulting Provider(s):  Consults       No orders found from  "1/31/2024 to 3/1/2024.             Admitting Diagnosis:   The primary encounter diagnosis was Hypokalemia. Diagnoses of Hyponatremia, Acute kidney injury, Tachycardia, and Hypotension, unspecified hypotension type were also pertinent to this visit.    Most Recent Vitals:   Vitals:    02/29/24 1120 02/29/24 1438 02/29/24 1439 02/29/24 1500   BP: 90/54 97/60  107/91   BP Location: Right arm      Patient Position: Sitting      Pulse: 109  95 93   Resp: 14      Temp: 98.2 °F (36.8 °C)      TempSrc: Oral      SpO2: 99%  98% 98%   Weight: 100 kg (221 lb)      Height: 182.9 cm (72\")          Active LDAs/IV Access:   Lines, Drains & Airways       Active LDAs       Name Placement date Placement time Site Days    Peripheral IV 02/29/24 1443 Left Antecubital 02/29/24  1443  Antecubital  less than 1                    Labs (abnormal labs have a star):   Labs Reviewed   COMPREHENSIVE METABOLIC PANEL - Abnormal; Notable for the following components:       Result Value    Glucose 120 (*)     Creatinine 1.64 (*)     Sodium 123 (*)     Potassium 2.4 (*)     Chloride 89 (*)     CO2 19.0 (*)     Calcium 12.0 (*)     ALT (SGPT) 119 (*)     AST (SGOT) 54 (*)     eGFR 46.1 (*)     All other components within normal limits    Narrative:     GFR Normal >60  Chronic Kidney Disease <60  Kidney Failure <15     SINGLE HSTROPONIN T - Abnormal; Notable for the following components:    HS Troponin T 34 (*)     All other components within normal limits    Narrative:     High Sensitive Troponin T Reference Range:  <14.0 ng/L- Negative Female for AMI  <22.0 ng/L- Negative Male for AMI  >=14 - Abnormal Female indicating possible myocardial injury.  >=22 - Abnormal Male indicating possible myocardial injury.   Clinicians would have to utilize clinical acumen, EKG, Troponin, and serial changes to determine if it is an Acute Myocardial Infarction or myocardial injury due to an underlying chronic condition.        CBC WITH AUTO DIFFERENTIAL - Abnormal; " Notable for the following components:    WBC 15.20 (*)     RBC 3.77 (*)     Hematocrit 36.9 (*)     MCV 97.9 (*)     MCH 35.5 (*)     MCHC 36.3 (*)     RDW 12.1 (*)     Neutrophil % 83.6 (*)     Lymphocyte % 5.4 (*)     Immature Grans % 0.7 (*)     Neutrophils, Absolute 12.71 (*)     Monocytes, Absolute 1.47 (*)     Immature Grans, Absolute 0.11 (*)     All other components within normal limits   PHOSPHORUS - Abnormal; Notable for the following components:    Phosphorus 1.6 (*)     All other components within normal limits   MAGNESIUM - Normal   TSH - Normal   BNP (IN-HOUSE) - Normal    Narrative:     This assay is used as an aid in the diagnosis of individuals suspected of having heart failure. It can be used as an aid in the diagnosis of acute decompensated heart failure (ADHF) in patients presenting with signs and symptoms of ADHF to the emergency department (ED). In addition, NT-proBNP of <300 pg/mL indicates ADHF is not likely.    Age Range Result Interpretation  NT-proBNP Concentration (pg/mL:      <50             Positive            >450                   Gray                 300-450                    Negative             <300    50-75           Positive            >900                  Gray                300-900                  Negative            <300      >75             Positive            >1800                  Gray                300-1800                  Negative            <300   RAINBOW DRAW    Narrative:     The following orders were created for panel order [place] Draw.  Procedure                               Abnormality         Status                     ---------                               -----------         ------                     Green Top (Gel)[[id number]]                                  Final result               Lavender Top[[id number]]                                     Final result               Gold Top - SST[[id number]]                                   Final result                Perez Top[[id number]]                                         In process                 Light Blue Top[[id number]]                                   Final result                 Please view results for these tests on the individual orders.   CBC AND DIFFERENTIAL    Narrative:     The following orders were created for panel order CBC & Differential.  Procedure                               Abnormality         Status                     ---------                               -----------         ------                     CBC Auto Differential[[id number]]        Abnormal            Final result                 Please view results for these tests on the individual orders.   GREEN TOP   LAVENDER TOP   GOLD TOP - SST   LIGHT BLUE TOP   GRAY TOP       Meds Given in ED:   Medications   sodium chloride 0.9 % flush 10 mL (has no administration in time range)   sodium chloride 0.9 % bolus 1,000 mL (1,000 mL Intravenous New Bag 2/29/24 1446)   sodium chloride 0.9 % bolus 1,000 mL (1,000 mL Intravenous New Bag 2/29/24 1457)   potassium chloride 10 mEq in 100 mL IVPB (10 mEq Intravenous New Bag 2/29/24 1454)   potassium chloride (KLOR-CON) packet 40 mEq (40 mEq Oral Given 2/29/24 1452)               Electronically signed by Lisa Figueroa, RN at 02/29/24 6200       Vital Signs (last day)       Date/Time Temp Temp src Pulse Resp BP Patient Position SpO2    03/01/24 0712 98.4 (36.9) Oral 83 18 110/78 Lying --    03/01/24 0551 -- -- 90 -- -- -- 98    03/01/24 0448 98.7 (37.1) Oral 99 18 119/74 Lying 97    03/01/24 0415 -- -- 90 -- -- -- 97    03/01/24 0200 -- -- 84 -- -- -- 95    03/01/24 0000 -- -- 84 -- -- -- 97    02/29/24 2140 98.4 (36.9) Oral 87 18 132/83 Lying 97    02/29/24 2001 -- -- 92 -- 148/79 -- 96    02/29/24 1900 -- -- 90 -- 145/88 -- 98    02/29/24 1830 -- -- -- -- 134/85 -- --    02/29/24 1829 -- -- 87 -- -- -- 97    02/29/24 1800 -- -- 87 -- 134/82 -- 98    02/29/24 1730 -- -- 86 -- 125/86 -- 98    02/29/24 1700 --  -- -- -- 135/89 -- --    02/29/24 1659 -- -- 87 -- -- -- 98    02/29/24 1630 -- -- 94 -- 119/80 -- 94    02/29/24 1600 -- -- 84 -- 117/71 -- 99    02/29/24 1500 -- -- 93 -- 107/91 -- 98    02/29/24 1439 -- -- 95 -- -- -- 98    02/29/24 1438 -- -- -- -- 97/60 -- --    02/29/24 1120 98.2 (36.8) Oral 109 14 90/54 Sitting 99          Oxygen Therapy (last day)       Date/Time SpO2 Device (Oxygen Therapy) Flow (L/min) Oxygen Concentration (%) ETCO2 (mmHg)    03/01/24 0551 98 room air -- -- --    03/01/24 0448 97 room air -- -- --    03/01/24 0415 97 room air -- -- --    03/01/24 0200 95 room air -- -- --    03/01/24 0000 97 room air -- -- --    02/29/24 2140 97 room air -- -- --    02/29/24 2001 96 -- -- -- --    02/29/24 1900 98 -- -- -- --    02/29/24 1829 97 -- -- -- --    02/29/24 1800 98 -- -- -- --    02/29/24 1730 98 -- -- -- --    02/29/24 1659 98 -- -- -- --    02/29/24 1630 94 -- -- -- --    02/29/24 1600 99 -- -- -- --    02/29/24 1500 98 -- -- -- --    02/29/24 1439 98 -- -- -- --    02/29/24 1120 99 room air -- -- --          Lines, Drains & Airways       Active LDAs       Name Placement date Placement time Site Days    Peripheral IV 02/29/24 1443 Left Antecubital 02/29/24  1443  Antecubital  less than 1                  Current Facility-Administered Medications   Medication Dose Route Frequency Provider Last Rate Last Admin    ALPRAZolam (XANAX) tablet 0.125 mg  0.125 mg Oral Daily PRN Heather Arana APRN   0.125 mg at 02/29/24 7465    sennosides-docusate (PERICOLACE) 8.6-50 MG per tablet 2 tablet  2 tablet Oral BID PRN Heather Arana APRN        And    polyethylene glycol (MIRALAX) packet 17 g  17 g Oral Daily PRN Heather Arana APRN        And    bisacodyl (DULCOLAX) EC tablet 5 mg  5 mg Oral Daily PRN Heather Arana APRN        And    bisacodyl (DULCOLAX) suppository 10 mg  10 mg Rectal Daily PRN Heather Arana APRN        Calcium Replacement - Follow Nurse / BPA Driven Protocol   Does not apply PRN [place],  CHIKA Romero        dextrose (D5W) 5 % infusion  75 mL/hr Intravenous Continuous Heather Arana APRN 75 mL/hr at 02/29/24 2144 75 mL/hr at 02/29/24 2144    folic acid (FOLVITE) tablet 1 mg  1 mg Oral Daily Heather Arana APRN   1 mg at 02/29/24 2146    heparin (porcine) 5000 UNIT/ML injection 5,000 Units  5,000 Units Subcutaneous Q8H Heather Arana APRN   5,000 Units at 03/01/24 0513    LORazepam (ATIVAN) tablet 1 mg  1 mg Oral Q1H PRN Heather Arana APRN        Or    midazolam (VERSED) injection 2 mg  2 mg Intravenous Q1H PRN Heather Arana APRN        Or    LORazepam (ATIVAN) tablet 2 mg  2 mg Oral Q1H PRN Heather Arana APRN        Or    midazolam (VERSED) injection 4 mg  4 mg Intravenous Q1H PRN Heather Arana APRN        Or    midazolam (VERSED) injection 4 mg  4 mg Intravenous Q15 Min PRN Heather Arana APRN        Or    midazolam (VERSED) injection 4 mg  4 mg Intramuscular Q15 Min PRN Heather Arana APRN        Magnesium Standard Dose Replacement - Follow Nurse / BPA Driven Protocol   Does not apply PRN Heather Arana APRN        ondansetron ODT (ZOFRAN-ODT) disintegrating tablet 4 mg  4 mg Oral Q8H PRN Heather Arana APRN   4 mg at 03/01/24 0546    pantoprazole (PROTONIX) EC tablet 40 mg  40 mg Oral Q AM Heather Arana APRN   40 mg at 03/01/24 0513    Phosphorus Replacement - Follow Nurse / BPA Driven Protocol   Does not apply PRN Heather Arana APRN        potassium chloride 10 mEq in 100 mL IVPB  10 mEq Intravenous Q1H Leonardo Doran  mL/hr at 03/01/24 0647 10 mEq at 03/01/24 0647    Potassium Replacement - Follow Nurse / BPA Driven Protocol   Does not apply PRN Heather Arana APRN        pravastatin (PRAVACHOL) tablet 40 mg  40 mg Oral Nightly Heather Arana APRN   40 mg at 02/29/24 2146    sodium chloride 0.9 % flush 10 mL  10 mL Intravenous PRN Dakota Murillo MD        sodium chloride 0.9 % flush 10 mL  10 mL Intravenous Q12H Heather Arana APRN   10 mL at 02/29/24 2144    sodium chloride 0.9 % flush 10 mL   10 mL Intravenous PRN Heather Arana APRN        thiamine (B-1) injection 200 mg  200 mg Intravenous Q8H Heather Arana APRN   200 mg at 03/01/24 0513    Followed by    [START ON 3/6/2024] thiamine (VITAMIN B-1) tablet 100 mg  100 mg Oral Daily Heather Arana APRN         Lab Results (last 24 hours)       Procedure Component Value Units Date/Time    Phosphorus [[id number]]  (Abnormal) Collected: 03/01/24 0359    Specimen: Blood Updated: 03/01/24 0526     Phosphorus 1.7 mg/dL     Basic Metabolic Panel [[id number]]  (Abnormal) Collected: 03/01/24 0359    Specimen: Blood Updated: 03/01/24 0525     Glucose 92 mg/dL      BUN 20 mg/dL      Creatinine 1.34 mg/dL      Sodium 126 mmol/L      Potassium 2.4 mmol/L      Chloride 98 mmol/L      CO2 17.0 mmol/L      Calcium 10.8 mg/dL      BUN/Creatinine Ratio 14.9     Anion Gap 11.0 mmol/L      eGFR 58.8 mL/min/1.73     Narrative:      GFR Normal >60  Chronic Kidney Disease <60  Kidney Failure <15      Magnesium [[id number]]  (Normal) Collected: 03/01/24 0359    Specimen: Blood Updated: 03/01/24 0522     Magnesium 2.0 mg/dL     High Sensitivity Troponin T [[id number]]  (Abnormal) Collected: 03/01/24 0359    Specimen: Blood Updated: 03/01/24 0517     HS Troponin T 31 ng/L     Narrative:      High Sensitive Troponin T Reference Range:  <14.0 ng/L- Negative Female for AMI  <22.0 ng/L- Negative Male for AMI  >=14 - Abnormal Female indicating possible myocardial injury.  >=22 - Abnormal Male indicating possible myocardial injury.   Clinicians would have to utilize clinical acumen, EKG, Troponin, and serial changes to determine if it is an Acute Myocardial Infarction or myocardial injury due to an underlying chronic condition.         CBC Auto Differential [[id number]]  (Abnormal) Collected: 03/01/24 0359    Specimen: Blood Updated: 03/01/24 0500     WBC 8.21 10*3/mm3      RBC 3.15 10*6/mm3      Hemoglobin 11.2 g/dL      Hematocrit 30.0 %      MCV 95.2 fL      MCH 35.6 pg      MCHC 37.3 g/dL       RDW 12.1 %      RDW-SD 42.2 fl      MPV 9.1 fL      Platelets 160 10*3/mm3      Neutrophil % 80.6 %      Lymphocyte % 9.1 %      Monocyte % 9.1 %      Eosinophil % 0.5 %      Basophil % 0.1 %      Immature Grans % 0.6 %      Neutrophils, Absolute 6.61 10*3/mm3      Lymphocytes, Absolute 0.75 10*3/mm3      Monocytes, Absolute 0.75 10*3/mm3      Eosinophils, Absolute 0.04 10*3/mm3      Basophils, Absolute 0.01 10*3/mm3      Immature Grans, Absolute 0.05 10*3/mm3      nRBC 0.0 /100 WBC     Phosphorus [[id number]]  (Abnormal) Collected: 03/01/24 0019    Specimen: Blood Updated: 03/01/24 0124     Phosphorus 1.5 mg/dL     Sodium [[id number]]  (Abnormal) Collected: 03/01/24 0019    Specimen: Blood Updated: 03/01/24 0120     Sodium 129 mmol/L     Sodium [[id number]]  (Abnormal) Collected: 03/01/24 0019    Specimen: Blood Updated: 03/01/24 0117     Sodium 130 mmol/L     High Sensitivity Troponin T 2Hr [[id number]]  (Abnormal) Collected: 02/29/24 2010    Specimen: Blood Updated: 02/29/24 2042     HS Troponin T 32 ng/L      Troponin T Delta -2 ng/L     Narrative:      High Sensitive Troponin T Reference Range:  <14.0 ng/L- Negative Female for AMI  <22.0 ng/L- Negative Male for AMI  >=14 - Abnormal Female indicating possible myocardial injury.  >=22 - Abnormal Male indicating possible myocardial injury.   Clinicians would have to utilize clinical acumen, EKG, Troponin, and serial changes to determine if it is an Acute Myocardial Infarction or myocardial injury due to an underlying chronic condition.         High Sensitivity Troponin T [[id number]]  (Abnormal) Collected: 02/29/24 1755    Specimen: Blood Updated: 02/29/24 1825     HS Troponin T 34 ng/L     Narrative:      High Sensitive Troponin T Reference Range:  <14.0 ng/L- Negative Female for AMI  <22.0 ng/L- Negative Male for AMI  >=14 - Abnormal Female indicating possible myocardial injury.  >=22 - Abnormal Male indicating possible myocardial injury.   Clinicians would have  to utilize clinical acumen, EKG, Troponin, and serial changes to determine if it is an Acute Myocardial Infarction or myocardial injury due to an underlying chronic condition.         Sodium [[id number]]  (Abnormal) Collected: 02/29/24 1755    Specimen: Blood Updated: 02/29/24 1821     Sodium 130 mmol/L     [place] Draw [[id number]] Collected: 02/29/24 1300    Specimen: Blood Updated: 02/29/24 1715    Narrative:      The following orders were created for panel order [place] Draw.  Procedure                               Abnormality         Status                     ---------                               -----------         ------                     Green Top (Gel)[[id number]]                                  Final result               Lavender Top[[id number]]                                     Final result               Gold Top - SST[[id number]]                                   Final result               Gray Top[[id number]]                                         Final result               Light Blue Top[[id number]]                                   Final result                 Please view results for these tests on the individual orders.    Gray Top [[id number]] Collected: 02/29/24 1300    Specimen: Blood Updated: 02/29/24 1715     Extra Tube Hold for add-ons.     Comment: Auto resulted.       Phosphorus [[id number]]  (Abnormal) Collected: 02/29/24 1300    Specimen: Blood Updated: 02/29/24 1534     Phosphorus 1.6 mg/dL     Green Top (Gel) [[id number]] Collected: 02/29/24 1300    Specimen: Blood Updated: 02/29/24 1401     Extra Tube Hold for add-ons.     Comment: Auto resulted.       Lavender Top [[id number]] Collected: 02/29/24 1300    Specimen: Blood Updated: 02/29/24 1401     Extra Tube hold for add-on     Comment: Auto resulted       Gold Top - SST [[id number]] Collected: 02/29/24 1300    Specimen: Blood Updated: 02/29/24 1401     Extra Tube Hold for add-ons.     Comment: Auto resulted.       Light Blue Top  [[id number]] Collected: 02/29/24 1300    Specimen: Blood Updated: 02/29/24 1401     Extra Tube Hold for add-ons.     Comment: Auto resulted       Single High Sensitivity Troponin T [[id number]]  (Abnormal) Collected: 02/29/24 1300    Specimen: Blood Updated: 02/29/24 1340     HS Troponin T 34 ng/L     Narrative:      High Sensitive Troponin T Reference Range:  <14.0 ng/L- Negative Female for AMI  <22.0 ng/L- Negative Male for AMI  >=14 - Abnormal Female indicating possible myocardial injury.  >=22 - Abnormal Male indicating possible myocardial injury.   Clinicians would have to utilize clinical acumen, EKG, Troponin, and serial changes to determine if it is an Acute Myocardial Infarction or myocardial injury due to an underlying chronic condition.         TSH [[id number]]  (Normal) Collected: 02/29/24 1300    Specimen: Blood Updated: 02/29/24 1340     TSH 2.190 uIU/mL     BNP [[id number]]  (Normal) Collected: 02/29/24 1300    Specimen: Blood Updated: 02/29/24 1340     proBNP 194.9 pg/mL     Narrative:      This assay is used as an aid in the diagnosis of individuals suspected of having heart failure. It can be used as an aid in the diagnosis of acute decompensated heart failure (ADHF) in patients presenting with signs and symptoms of ADHF to the emergency department (ED). In addition, NT-proBNP of <300 pg/mL indicates ADHF is not likely.    Age Range Result Interpretation  NT-proBNP Concentration (pg/mL:      <50             Positive            >450                   Gray                 300-450                    Negative             <300    50-75           Positive            >900                  Gray                300-900                  Negative            <300      >75             Positive            >1800                  Gray                300-1800                  Negative            <300    Comprehensive Metabolic Panel [[id number]]  (Abnormal) Collected: 02/29/24 1300    Specimen: Blood Updated: 02/29/24  1337     Glucose 120 mg/dL      BUN 21 mg/dL      Creatinine 1.64 mg/dL      Sodium 123 mmol/L      Potassium 2.4 mmol/L      Comment: Slight hemolysis detected by analyzer. Result may be falsely elevated.        Chloride 89 mmol/L      CO2 19.0 mmol/L      Calcium 12.0 mg/dL      Total Protein 7.2 g/dL      Albumin 4.3 g/dL      ALT (SGPT) 119 U/L      AST (SGOT) 54 U/L      Alkaline Phosphatase 110 U/L      Total Bilirubin 0.7 mg/dL      Globulin 2.9 gm/dL      Comment: Calculated Result        A/G Ratio 1.5 g/dL      BUN/Creatinine Ratio 12.8     Anion Gap 15.0 mmol/L      eGFR 46.1 mL/min/1.73     Narrative:      GFR Normal >60  Chronic Kidney Disease <60  Kidney Failure <15      Magnesium [[id number]]  (Normal) Collected: 02/29/24 1300    Specimen: Blood Updated: 02/29/24 1337     Magnesium 2.2 mg/dL     CBC & Differential [[id number]]  (Abnormal) Collected: 02/29/24 1300    Specimen: Blood Updated: 02/29/24 1312    Narrative:      The following orders were created for panel order CBC & Differential.  Procedure                               Abnormality         Status                     ---------                               -----------         ------                     CBC Auto Differential[[id number]]        Abnormal            Final result                 Please view results for these tests on the individual orders.    CBC Auto Differential [[id number]]  (Abnormal) Collected: 02/29/24 1300    Specimen: Blood Updated: 02/29/24 1312     WBC 15.20 10*3/mm3      RBC 3.77 10*6/mm3      Hemoglobin 13.4 g/dL      Hematocrit 36.9 %      MCV 97.9 fL      MCH 35.5 pg      MCHC 36.3 g/dL      RDW 12.1 %      RDW-SD 43.7 fl      MPV 9.0 fL      Platelets 222 10*3/mm3      Neutrophil % 83.6 %      Lymphocyte % 5.4 %      Monocyte % 9.7 %      Eosinophil % 0.4 %      Basophil % 0.2 %      Immature Grans % 0.7 %      Neutrophils, Absolute 12.71 10*3/mm3      Lymphocytes, Absolute 0.82 10*3/mm3      Monocytes, Absolute 1.47  10*3/mm3      Eosinophils, Absolute 0.06 10*3/mm3      Basophils, Absolute 0.03 10*3/mm3      Immature Grans, Absolute 0.11 10*3/mm3      nRBC 0.0 /100 WBC           Imaging Results (Last 24 Hours)       Procedure Component Value Units Date/Time    XR Chest 1 View [[id number]] Collected: 02/29/24 1242     Updated: 02/29/24 1246    Narrative:      XR CHEST 1 VW    Date of Exam: 2/29/2024 12:14 PM EST    Indication: Dysrhythmia triage protocol    Comparison: 8/31/2021    Findings:  The lungs appear adequately aerated without consolidation or mass. No pleural effusion or pneumothorax is identified. The cardiomediastinal silhouette and pulmonary vasculature appear within normal limits. No acute or suspicious osseous lesion is   identified.       Impression:      Impression:  1.No acute radiographic abnormality is identified.    Electronically Signed: Vitaliy Smith MD    2/29/2024 12:43 PM EST    Workstation ID: [id number]          ECG/EMG Results (last 24 hours)       Procedure Component Value Units Date/Time    ECG 12 Lead ED Triage Standing Order; Dysrhythmia [[id number]] Collected: 02/29/24 1148     Updated: 02/29/24 1152     QT Interval 332 ms      QTC Interval 444 ms     Narrative:      Test Reason : ED Triage Standing Order~  Blood Pressure :   */*   mmHG  Vent. Rate : 108 BPM     Atrial Rate : 108 BPM     P-R Int : 196 ms          QRS Dur :  98 ms      QT Int : 332 ms       P-R-T Axes :  14 -34  39 degrees     QTc Int : 444 ms    Sinus tachycardia  Left axis deviation  Incomplete right bundle branch block  Inferior infarct , age undetermined  Abnormal ECG  When compared with ECG of 05-SEP-2013 11:46,  Inferior infarct is now present  ST now depressed in Anterior leads    Referred By:            Confirmed By:     ECG 12 Lead Rhythm Change [[id number]] Collected: 02/29/24 1448     Updated: 03/01/24 0701     QT Interval 372 ms      QTC Interval 447 ms     Narrative:      Test Reason : Rhythm Change  Blood Pressure :    */*   mmHG  Vent. Rate :  87 BPM     Atrial Rate :  87 BPM     P-R Int : 218 ms          QRS Dur : 114 ms      QT Int : 372 ms       P-R-T Axes :  22 -34  36 degrees     QTc Int : 447 ms    Sinus rhythm with 1st degree AV block  Left axis deviation  Incomplete right bundle branch block  Abnormal ECG  When compared with ECG of 29-FEB-2024 11:48, (Unconfirmed)  No significant change was found    Referred By: EDMD           Confirmed By:

## 2024-03-01 NOTE — CONSULTS
[facility] [place] [facility]astroenterology Consult    Referring Provider: Abril Quigley MD     PCP: Kelby Mann MD    Reason for Consultation: Nausea and weight loss     Chief complaint: Profound nausea     History of present illness:    Campos Alba is a 63 y.o. male who is admitted with a five month history of profound nausea.   He is found to have multiple severe electrolyte abnormalities including hypokalemia (K+ of 2.4), hyponatremia (Na of 123) hypophosphatemia,  and hypercalcemia.    He states his nausea began abruptly in October 2023 after eating a large pizza.   This persisted for a month stating he was only able to eat approximately 4 almonds a day.   His nausea then briefly improved for 4-6 weeks and has intermittently returned.       He has had 2 episodes of emesis total since October.   He denies abdominal pain.       He has unintentionally lost 50 lbs.         He underwent an outpatient contrasted CT abdomen by his PCP in November that was reportedly unremarkable.  He also underwent an EGD with Dr. Sethi at [facility] that was also unremarkable.   He states he has had a remote colonoscopy.      Allergies:  Patient has no known allergies.    Scheduled Meds:  folic acid, 1 mg, Oral, Daily  heparin (porcine), 5,000 Units, Subcutaneous, Q8H  pantoprazole, 40 mg, Oral, Q AM  potassium phosphate, 15 mmol, Intravenous, Once  pravastatin, 40 mg, Oral, Nightly  sodium chloride, 10 mL, Intravenous, Q12H  thiamine (B-1) IV, 200 mg, Intravenous, Q8H   Followed by  [START ON 3/6/2024] thiamine, 100 mg, Oral, Daily         Infusions:  dextrose, 75 mL/hr, Last Rate: 75 mL/hr (03/01/24 1300)        PRN Meds:    ALPRAZolam    senna-docusate sodium **AND** polyethylene glycol **AND** bisacodyl **AND** bisacodyl    Calcium Replacement - Follow Nurse / BPA Driven Protocol    LORazepam **OR** midazolam **OR** LORazepam **OR** midazolam **OR** midazolam **OR** midazolam    Magnesium Standard Dose Replacement - Follow Nurse / BPA  Driven Protocol    ondansetron ODT    Phosphorus Replacement - Follow Nurse / BPA Driven Protocol    Potassium Replacement - Follow Nurse / BPA Driven Protocol    sodium chloride    sodium chloride    Home Meds:  Medications Prior to Admission   Medication Sig Dispense Refill Last Dose    ALPRAZolam (XANAX) 0.25 MG tablet Take 0.5 tablets by mouth Daily As Needed for Anxiety.   Past Week    clotrimazole (LOTRIMIN) 1 % cream Apply 1 Application topically to the appropriate area as directed 2 (Two) Times a Day.   2/28/2024    omeprazole (priLOSEC) 40 MG capsule Take 1 capsule by mouth Daily.   2/28/2024    ondansetron ODT (ZOFRAN-ODT) 4 MG disintegrating tablet Place 1 tablet on the tongue Every 8 (Eight) Hours As Needed for Nausea or Vomiting.   Past Week    pravastatin (PRAVACHOL) 40 MG tablet Take 1 tablet by mouth Daily.   2/28/2024    prazosin (MINIPRESS) 5 MG capsule Take 1 capsule by mouth Every Night.   2/28/2024       ROS: Review of Systems   Constitutional:  Positive for fatigue.   HENT: Negative.     Eyes: Negative.    Respiratory: Negative.     Cardiovascular: Negative.    Gastrointestinal:  Positive for nausea. Negative for abdominal pain and vomiting.   Endocrine: Negative.    Genitourinary: Negative.    Musculoskeletal: Negative.    Skin: Negative.    Neurological:  Positive for dizziness and weakness.   Hematological: Negative.    Psychiatric/Behavioral: Negative.         PAST MED HX:  Past Medical History:   Diagnosis Date    HTN (hypertension)     Hyperlipidemia        PAST SURG HX:  Past Surgical History:   Procedure Laterality Date    ANKLE SURGERY Left 2006    TOTAL HIP ARTHROPLASTY Right 2012    WISDOM TOOTH EXTRACTION         FAM HX:  Family History   Problem Relation Age of Onset    No Known Problems Mother     Arthritis Father     Alzheimer's disease Father     Hypertension Father        SOC HX:  Social History     Socioeconomic History    Marital status:    Tobacco Use    Smoking  "status: Never   Vaping Use    Vaping Use: Never used   Substance and Sexual Activity    Alcohol use: Yes     Comment: 2 beers daily    Drug use: Never       PHYSICAL EXAM  /74 (BP Location: Right arm, Patient Position: Lying)   Pulse 89   Temp 98.2 °F (36.8 °C) (Oral)   Resp 18   Ht 182.9 cm (72\")   Wt 101 kg (223 lb)   SpO2 95%   BMI 30.24 kg/m²   Wt Readings from Last 3 Encounters:   03/01/24 101 kg (223 lb)   ,body mass index is 30.24 kg/m².  Physical Exam  Constitutional:       General: He is not in acute distress.     Appearance: He is ill-appearing.   Cardiovascular:      Rate and Rhythm: Normal rate and regular rhythm.   Pulmonary:      Effort: Pulmonary effort is normal. No respiratory distress.   Abdominal:      General: Bowel sounds are normal. There is no distension.      Palpations: Abdomen is soft.      Tenderness: There is no abdominal tenderness. There is no guarding.   Skin:     General: Skin is warm and dry.   Neurological:      Mental Status: He is alert.   Psychiatric:      Comments: Intermittently tearful       Results Review:   I reviewed the patient's new clinical results.    Lab Results   Component Value Date    WBC 8.21 03/01/2024    HGB 11.2 (L) 03/01/2024    HGB 13.4 02/29/2024    HCT 30.0 (L) 03/01/2024    MCV 95.2 03/01/2024     03/01/2024       No results found for: \"INR\"    Lab Results   Component Value Date    GLUCOSE 92 03/01/2024    BUN 20 03/01/2024    CREATININE 1.34 (H) 03/01/2024    BCR 14.9 03/01/2024     (L) 03/01/2024    K 2.4 (C) 03/01/2024    CO2 17.0 (L) 03/01/2024    CALCIUM 10.8 (H) 03/01/2024    ALBUMIN 4.3 02/29/2024    ALKPHOS 110 02/29/2024    BILITOT 0.7 02/29/2024     (H) 02/29/2024    AST 54 (H) 02/29/2024       ASSESSMENTS/PLANS    Persistent nausea without emesis   Unintentional weight loss, 50 lbs  Elevated transaminases   Dizziness   Hyponatremia  Hypokalemia   Hypercalcemia   Hypophosphatemia     >> Recommend contrasted CT " Abdomen/pelvis tomorrow   >> Obtain records from [facility] regarding EGD report and pathology     >> Agree with contrasted brain MRI    >> Viral hepatitis studies   >> High dose IV Thiamine as he is at risk for deficiency with his anorexia and this can contribute to nausea    >> Can consider repeat EGD pending work up above when electrolytes corrected.       I discussed the patient's findings and my recommendations with patient    ELLA Sauceda  03/01/24  15:09 EST

## 2024-03-01 NOTE — PROGRESS NOTES
[facility] Services  PROGRESS NOTE    Patient Name: Campos Alba  : 1960  MRN: [id number]    Date of Admission: 2024  Primary Care Physician: Kelby Mann MD    Subjective   Subjective     CC:  Weight loss, N/V    HPI:  Patient states that he is doing fair as long as he does not change positions.  Has been able to keep down approximately 2 boost per day.  Adamantly denies any recent alcohol or any other type of drug use.  Has recently been seen by GI and had a CAT scan for which per his report was normal.  Has never had any imaging of his brain and has severe claustrophobia to where he was unable to even tolerate an open MRI.      Objective   Objective     Vital Signs:   Temp:  [98.2 °F (36.8 °C)-98.7 °F (37.1 °C)] 98.2 °F (36.8 °C)  Heart Rate:  [83-99] 89  Resp:  [18] 18  BP: ()/(60-91) 122/74     Physical Exam:  Constitutional: No acute distress, awake, alert  HENT: NCAT, mucous membranes moist  Respiratory: Clear to auscultation bilaterally, respiratory effort normal   Cardiovascular: RRR, no murmurs, rubs, or gallops  Gastrointestinal: Positive bowel sounds, soft, nontender, nondistended  Musculoskeletal: No bilateral ankle edema  Psychiatric: Appropriate affect, cooperative  Neurologic: Oriented x 3, strength symmetric in all extremities, Cranial Nerves grossly intact to confrontation, speech clear  Skin: No rashes      Results Reviewed:  LAB RESULTS:      Lab 24  0359 24  1300   WBC 8.21 15.20*   HEMOGLOBIN 11.2* 13.4   HEMATOCRIT 30.0* 36.9*   PLATELETS 160 222   NEUTROS ABS 6.61 12.71*   IMMATURE GRANS (ABS) 0.05 0.11*   LYMPHS ABS 0.75 0.82   MONOS ABS 0.75 1.47*   EOS ABS 0.04 0.06   MCV 95.2 97.9*         Lab 24  0359 24  0019 24  1755 24  1300   SODIUM 126* 129*  130* 130* 123*   POTASSIUM 2.4*  --   --  2.4*   CHLORIDE 98  --   --  89*   CO2 17.0*  --   --  19.0*   ANION GAP 11.0  --   --  15.0   BUN 20   --   --  21   CREATININE 1.34*  --   --  1.64*   EGFR 58.8*  --   --  46.1*   GLUCOSE 92  --   --  120*   CALCIUM 10.8*  --   --  12.0*   MAGNESIUM 2.0  --   --  2.2   PHOSPHORUS 1.7* 1.5*  --  1.6*   TSH  --   --   --  2.190         Lab 02/29/24  1300   TOTAL PROTEIN 7.2   ALBUMIN 4.3   GLOBULIN 2.9   ALT (SGPT) 119*   AST (SGOT) 54*   BILIRUBIN 0.7   ALK PHOS 110         Lab 03/01/24  0359 02/29/24 2010 02/29/24  1755 02/29/24  1300   PROBNP  --   --   --  194.9   HSTROP T 31* 32* 34* 34*                 Brief Urine Lab Results       None            Microbiology Results Abnormal       None            XR Chest 1 View    Result Date: 2/29/2024  XR CHEST 1 VW Date of Exam: 2/29/2024 12:14 PM EST Indication: Dysrhythmia triage protocol Comparison: 8/31/2021 Findings: The lungs appear adequately aerated without consolidation or mass. No pleural effusion or pneumothorax is identified. The cardiomediastinal silhouette and pulmonary vasculature appear within normal limits. No acute or suspicious osseous lesion is identified.     Impression: Impression: 1.No acute radiographic abnormality is identified. Electronically Signed: Vitaliy Smith MD  2/29/2024 12:43 PM EST  Workstation ID: [id number]         Current medications:  Scheduled Meds:folic acid, 1 mg, Oral, Daily  heparin (porcine), 5,000 Units, Subcutaneous, Q8H  pantoprazole, 40 mg, Oral, Q AM  potassium phosphate, 15 mmol, Intravenous, Once  pravastatin, 40 mg, Oral, Nightly  sodium chloride, 10 mL, Intravenous, Q12H  thiamine (B-1) IV, 200 mg, Intravenous, Q8H   Followed by  [START ON 3/6/2024] thiamine, 100 mg, Oral, Daily      Continuous Infusions:dextrose, 75 mL/hr, Last Rate: 75 mL/hr (03/01/24 1300)      PRN Meds:.  ALPRAZolam    senna-docusate sodium **AND** polyethylene glycol **AND** bisacodyl **AND** bisacodyl    Calcium Replacement - Follow Nurse / BPA Driven Protocol    LORazepam **OR** midazolam **OR** LORazepam **OR** midazolam **OR** midazolam  **OR** midazolam    Magnesium Standard Dose Replacement - Follow Nurse / BPA Driven Protocol    ondansetron ODT    Phosphorus Replacement - Follow Nurse / BPA Driven Protocol    Potassium Replacement - Follow Nurse / BPA Driven Protocol    sodium chloride    sodium chloride    Assessment & Plan   Assessment & Plan     Active Hospital Problems    Diagnosis  POA    **Hyponatremia [E87.1]  Yes    Hypokalemia [E87.6]  Yes    Nausea [R11.0]  Yes    Dizziness [R42]  Yes    Weight loss [R63.4]  Yes    ZAIRE (acute kidney injury) [N17.9]  Yes      Resolved Hospital Problems   No resolved problems to display.        Brief Hospital Course to date:  Campos Alba is a 63 y.o. male with a history of hypertension and hyperlipidemia presents to the ED initially after seen by his PCP for EKG changes.  Found to have had significant weight loss with nausea and vomiting and multiple electrolyte derangements.      Nausea  Unintentional weight loss (50lbs since October)  Poor appetite  --GI consult pending, appreciate recs.  --EGD in Nov 23 which was reportedly negative  --uses CBD gummies  --per report CT A/P unremarkable--ordered by prior GI doc that did his EGD.  -- Continue symptomatic care and IV fluids  --With the severity of his nausea and vomiting with orthostatic type changes would also recommend an MRI of his brain.  Spoke with patient about this and due to severe claustrophobia even in an open MRI I have consulted anesthesia to aid with possible conscious sedation MRI.     Dizziness  --MRI as above  --questionable med related + dehydration  --check orthostatics     Hyponatremia  --Na level is stable, initially trended up.  Will continue to monitor.  Most likely secondary to hypovolemia.  Although no serum osmolality or urine studies were done prior to IV fluid initiation.  --BMP this PM and in AM    Hypokalemia  Hypophosphatemia  --Replace per protocol.  --Will need to monitor closely for possible refeeding syndrome  specially as he may be able to tolerate more p.o. intake.    ZAIRE  -- Improving from admission  ---Avoid nephrotoxic medications and renally dose all medications.  -Monitor I/O     ETOH use   --drinks on a daily basis--but hasn't had any recently.  No hx of withdrawal.    --continue home xanax  --withdrawal protocol  --addiction team to see  --low Na may be his baseline with ETOH use     EKG changes  --troponin stable  --denies chest pain, continue to monitor     Recent Balanitis  --follows with Dr. Simmons  --office notes mention possible need for biopsy      Expected Discharge Location and Transportation: home  Expected Discharge   Expected Discharge Date: 3/2/2024; Expected Discharge Time:      DVT prophylaxis:  Medical and mechanical DVT prophylaxis orders are present.         AM-PAC 6 Clicks Score (PT): 18 (03/01/24 1245)    CODE STATUS:   Code Status and Medical Interventions:   Ordered at: 02/29/24 1876     Medical Intervention Limits:    NO intubation (DNI)     Level Of Support Discussed With:    Patient     Code Status (Patient has no pulse and is not breathing):    No CPR (Do Not Attempt to Resuscitate)     Medical Interventions (Patient has pulse or is breathing):    Limited Support       Abril Quigley MD  03/01/24

## 2024-03-01 NOTE — PROGRESS NOTES
Jameson Cristina :  1979 MRN:  [id number]    2019 Time Session Began:  9:50am  Time Session Ended: 10:40am    Session Type:  45 Minute Therapy (15985)    Others Present:  N/A    Intervention:  Behavioral, Cognitive, Supportive    Suicide/Homicide/Violence Ideation:  No    If Yes, explain:  N/A    Current Outpatient Medications   Medication Sig   • oxycodone (ROXICODONE) 30 MG immediate release tablet Take 1 tablet by mouth every 8 hours as needed for Pain.   • ALPRAZolam (XANAX) 2 MG tablet Take 1 tablet by mouth 3 times daily as needed for Anxiety.   • tiZANidine (ZANAFLEX) 4 MG tablet Take 1 tablet by mouth every 8 hours as needed (muscle spasm).   • ARIPiprazole, sensor, 5 MG Tab Take 5 mg by mouth daily.   • albuterol 108 (90 Base) MCG/ACT inhaler Inhale 2 puffs into the lungs every 4 hours as needed for Shortness of Breath or Wheezing.   • metoPROLOL tartrate (LOPRESSOR) 50 MG tablet Take 1 tablet by mouth 2 times daily.   • atorvastatin (LIPITOR) 10 MG tablet Take 1 tablet by mouth daily.   • meloxicam (MOBIC) 15 MG tablet Take 1 tablet by mouth daily.   • FLUoxetine (PROZAC) 20 MG capsule Take 2 capsules by mouth 2 times daily.   • oxyCODONE ER 9 MG Capsule Extended Release 12 hour Abuse-Deterrent Take 1 capsule by mouth 3 times daily.   • Spacer/Aero-Holding Chambers (OPTIHALER) Misc Use as directed.   • ondansetron (ZOFRAN) 4 MG tablet Take 1 tablet by mouth every 12 hours as needed for Nausea.     No current facility-administered medications for this visit.        Change in Medication(s) Reported:  No  If Yes, explain: N/A    Patient/Family Education Provided:  Yes  Patient/Family Displays Understanding:  Yes    If No, explain:  N/A    Chief complaint in patient's own words:  \"My anxiety and depression is worse.\"    Progress Note containing chief complaint and symptoms/problems related to the complaint:    (Data/Action/Response/Plan)    D: Mr. Cristina reported he started his drug treatment  Malnutrition Severity Assessment    Patient Name:  Campos Alba  YOB: 1960  MRN: [id number]  Admit Date:  2/29/2024    Patient meets criteria for : Severe Malnutrition    Comments:  Pt meets criteria for severe malnutrition in the context of chronic illness indicated by po intake <75% EEN x >/=1 month, moderate muscle wasting and subcutaneous fat loss. Of note, pt reporting 50lb unintended wt loss x 4 months, however limited wt data available to verify this.     Malnutrition Severity Assessment  Malnutrition Type: Chronic Disease - Related Malnutrition  Malnutrition Type (last 8 hours)       Malnutrition Severity Assessment       Row Name 03/01/24 1240       Malnutrition Severity Assessment    Malnutrition Type Chronic Disease - Related Malnutrition      Row Name 03/01/24 1240       Insufficient Energy Intake     Insufficient Energy Intake Findings Severe    Insufficient Energy Intake  <75% of est. energy requirement for > or equal to 1 month      Row Name 03/01/24 1240       Unintentional Weight Loss     Unintentional Weight Loss Findings --  pt reports 50lb unintended wt loss x 4 months, limited wt data to verify this.      Row Name 03/01/24 1240       Muscle Loss    Loss of Muscle Mass Findings Moderate    Clavicle Bone Region Moderate - some protrusion in females, visible in males    Acromion Bone Region Moderate - acromion may slightly protrude    Scapular Bone Region Moderate - mild depression, bones may show slightly    Patellar Region Moderate - patella more prominent, less muscle definition around patella    Anterior Thigh Region Moderate - mild depression on inner thigh    Posterior Calf Region Moderate - some roundness, slight firmness      Row Name 03/01/24 1240       Fat Loss    Subcutaneous Fat Loss Findings Moderate    Upper Arm Region Moderate - some fat tissue, not ample      Row Name 03/01/24 1240       Criteria Met (Must meet criteria for severity in at least 2 of these  categories: M Wasting, Fat Loss, Fluid, Secondary Signs, Wt. Status, Intake)    Patient meets criteria for  Severe Malnutrition                    Electronically signed by:  Cat Benito MS,RD,LD  03/01/24 12:58 EST   program two weeks ago as part of an agreement from his open court case. He reported he has to participate in this program for the next year and then his charges will be dropped. He reported the last two weeks have been difficult for him as he has limited transportation options to get to his meetings. He stated he also has to call everyday to see if he needs to come in for a random urine analysis. He stated he is also meeting with a AOD[facility] counselor twice week and needs to complete 25 hours of community service. He stated he has a number from his insurance company that he can call to see if he can get transportation through them to help get to his appointments. He reported he continues to work with his primary care provider on weaning off of his opioid medication and his provider recently started him on Abilify to help with his mood.   A: Processed how his court case went and starting his substance treatment program. Encouraged him to view this next year as an opportunity to grow and learn. Also discussed how having a daily structure could help with his pain management and mood. Encouraged him to call his insurance company to explore transportation options. Brainstormed ways he can stay organized with all of his commitments/appointments. Brainstormed possible volunteer opportunities he could engage in.   R: Mr. Cristina's mood was dysthymic. He reported he plans on calling his insurance about transportation options today. He also stated he is planning to buy a pocket calendar/planner to help stay organized with all of his appointments.   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

## 2024-03-01 NOTE — PROGRESS NOTES
"                  Clinical Nutrition   Nutrition Support Assessment  Reason for Visit: MST score 2+, Unintentional weight loss, Reduced oral intake      Patient Name: Campos Alba  YOB: 1960  MRN: [id number]  Date of Encounter: 03/01/24 12:33 EST  Admission date: 2/29/2024    Comments:  Pt meets criteria for severe malnutrition in the context of chronic illness indicated by po intake <75% EEN x >/=1 month, moderate muscle wasting and subcutaneous fat loss. Of note, pt reporting 50lb unintended wt loss x 4 months, however limited wt data available to verify this.     Ordered Boost BID, Ensure Clear daily  Meal preferences obtained and communicated to kitchen    Nutrition Assessment   Admission Diagnosis:  Hyponatremia [E87.1]      Problem List:    Hyponatremia    Hypokalemia    Nausea    Dizziness    Weight loss    ZAIRE (acute kidney injury)        PMH:   He  has a past medical history of HTN (hypertension) and Hyperlipidemia.    PSH:  He  has a past surgical history that includes Total hip arthroplasty (Right, 2012); Ankle surgery (Left, 2006); and [place] tooth extraction.    Applicable Nutrition Concerns:   Skin:  Oral:  GI:    Applicable Interval History:     Reported/Observed/Food/Nutrition Related History:     Nutrition hx obtained from pt and spouse-pt states he has had 50lb wt loss since October. Pt notes 10lb wt loss was intended prior to October. CIW=[id number]. Pt states he has had ongoing nausea and vomiting and intake has been worsening in the past week. Pt is mainly consuming 2-3 Boost daily, if able to tolerate is eating a sandwich or deli turkey in addition to Boosts. Pt tells me he would like bland food at this time-took preferences for lunch. Pt states he is having some difficulty swallowing pills and has a sensitive gag reflex. NKFA.     Anthropometrics     Flowsheet Rows      Flowsheet Row First Filed Value   Admission Height 182.9 cm (72\") Documented at 02/29/2024 1120 " "  Admission Weight 100 kg (221 lb) Documented at 02/29/2024 1120          Height: Height: 182.9 cm (72\")  Last Filed Weight: Weight: 107 kg (235 lb 14.3 oz) (02/29/24 2140)  Method: Weight Method: Stated  BMI: BMI (Calculated): 32  BMI classification: Obese Class I: 30-34.9kg/m2  IBW:  78kg    UBW:  276lbs  Weight change:   nsg obtained standing wt of 223lbs.    Weight      Weight (kg) Weight (lbs) Weight Method   2/29/2024 107 kg  235 lb 14.3 oz  Stated     100.245 kg  221 lb       Nutrition Focused Physical Exam     Date:    3/1     Patient meets criteria for malnutrition diagnosis, see MSA note.    Current Nutrition Prescription   PO: Diet: Regular/House Diet; Texture: Regular Texture (IDDSI 7); Fluid Consistency: Thin (IDDSI 0)  Oral Nutrition Supplement:   Intake: Insufficient data RD observed pt drank Boost GC for breakfast    Nutrition Diagnosis   Date:  3/1            Updated:    Problem Malnutrition severe chronic   Etiology Nausea/vomiting x 4 months    Signs/Symptoms Po intake <75% EEN x >/=1 month, moderate muscle wasting and subcutaneous fat loss   Status:     Goal:   General: Nutrition to support treatment  PO: Tolerate PO, Increase intake  EN/PN: N/A    Nutrition Intervention      Follow treatment progress, Care plan reviewed, Interview for preferences, Encourage intake, Supplement provided    Boost w/breakfast and dinner  Ensure Clear w/lunch  Peach yogurt and jello on all trays    Monitoring/Evaluation:   Per protocol, PO intake, Supplement intake, Weight, Symptoms      Cat Benito, MS,RD,LD  Time Spent: 35  "

## 2024-03-02 ENCOUNTER — APPOINTMENT (OUTPATIENT)
Dept: CT IMAGING | Facility: HOSPITAL | Age: 64
DRG: 682 | End: 2024-03-02
Payer: MEDICARE

## 2024-03-02 VITALS
RESPIRATION RATE: 16 BRPM | DIASTOLIC BLOOD PRESSURE: 88 MMHG | SYSTOLIC BLOOD PRESSURE: 133 MMHG | BODY MASS INDEX: 30.2 KG/M2 | HEART RATE: 69 BPM | OXYGEN SATURATION: 98 % | TEMPERATURE: 98.3 F | HEIGHT: 72 IN | WEIGHT: 223 LBS

## 2024-03-02 PROBLEM — R11.0 NAUSEA: Status: RESOLVED | Noted: 2024-02-29 | Resolved: 2024-03-02

## 2024-03-02 PROBLEM — F41.8 ANXIETY ASSOCIATED WITH DEPRESSION: Status: ACTIVE | Noted: 2024-03-02

## 2024-03-02 PROBLEM — R42 DIZZINESS: Status: RESOLVED | Noted: 2024-02-29 | Resolved: 2024-03-02

## 2024-03-02 PROBLEM — N17.9 AKI (ACUTE KIDNEY INJURY): Status: RESOLVED | Noted: 2024-02-29 | Resolved: 2024-03-02

## 2024-03-02 LAB
ANION GAP SERPL CALCULATED.3IONS-SCNC: 12 MMOL/L (ref 5–15)
ANION GAP SERPL CALCULATED.3IONS-SCNC: 13 MMOL/L (ref 5–15)
BASOPHILS # BLD AUTO: 0.02 10*3/MM3 (ref 0–0.2)
BASOPHILS NFR BLD AUTO: 0.2 % (ref 0–1.5)
BUN SERPL-MCNC: 10 MG/DL (ref 8–23)
BUN SERPL-MCNC: 11 MG/DL (ref 8–23)
BUN/CREAT SERPL: 11.2 (ref 7–25)
BUN/CREAT SERPL: 9.4 (ref 7–25)
CALCIUM SPEC-SCNC: 10.7 MG/DL (ref 8.6–10.5)
CALCIUM SPEC-SCNC: 10.9 MG/DL (ref 8.6–10.5)
CHLORIDE SERPL-SCNC: 97 MMOL/L (ref 98–107)
CHLORIDE SERPL-SCNC: 99 MMOL/L (ref 98–107)
CO2 SERPL-SCNC: 15 MMOL/L (ref 22–29)
CO2 SERPL-SCNC: 17 MMOL/L (ref 22–29)
CREAT SERPL-MCNC: 0.98 MG/DL (ref 0.76–1.27)
CREAT SERPL-MCNC: 1.06 MG/DL (ref 0.76–1.27)
DEPRECATED RDW RBC AUTO: 42.6 FL (ref 37–54)
EGFRCR SERPLBLD CKD-EPI 2021: 77.9 ML/MIN/1.73
EGFRCR SERPLBLD CKD-EPI 2021: 85.6 ML/MIN/1.73
EOSINOPHIL # BLD AUTO: 0.08 10*3/MM3 (ref 0–0.4)
EOSINOPHIL NFR BLD AUTO: 1 % (ref 0.3–6.2)
ERYTHROCYTE [DISTWIDTH] IN BLOOD BY AUTOMATED COUNT: 12 % (ref 12.3–15.4)
GLUCOSE SERPL-MCNC: 92 MG/DL (ref 65–99)
GLUCOSE SERPL-MCNC: 96 MG/DL (ref 65–99)
HCT VFR BLD AUTO: 32 % (ref 37.5–51)
HGB BLD-MCNC: 11.8 G/DL (ref 13–17.7)
IMM GRANULOCYTES # BLD AUTO: 0.07 10*3/MM3 (ref 0–0.05)
IMM GRANULOCYTES NFR BLD AUTO: 0.8 % (ref 0–0.5)
LYMPHOCYTES # BLD AUTO: 0.9 10*3/MM3 (ref 0.7–3.1)
LYMPHOCYTES NFR BLD AUTO: 10.7 % (ref 19.6–45.3)
MAGNESIUM SERPL-MCNC: 1.8 MG/DL (ref 1.6–2.4)
MCH RBC QN AUTO: 35.3 PG (ref 26.6–33)
MCHC RBC AUTO-ENTMCNC: 36.9 G/DL (ref 31.5–35.7)
MCV RBC AUTO: 95.8 FL (ref 79–97)
MONOCYTES # BLD AUTO: 0.76 10*3/MM3 (ref 0.1–0.9)
MONOCYTES NFR BLD AUTO: 9 % (ref 5–12)
NEUTROPHILS NFR BLD AUTO: 6.58 10*3/MM3 (ref 1.7–7)
NEUTROPHILS NFR BLD AUTO: 78.3 % (ref 42.7–76)
NRBC BLD AUTO-RTO: 0 /100 WBC (ref 0–0.2)
PHOSPHATE SERPL-MCNC: 2.1 MG/DL (ref 2.5–4.5)
PHOSPHATE SERPL-MCNC: 2.2 MG/DL (ref 2.5–4.5)
PLATELET # BLD AUTO: 158 10*3/MM3 (ref 140–450)
PMV BLD AUTO: 9.2 FL (ref 6–12)
POTASSIUM SERPL-SCNC: 3.1 MMOL/L (ref 3.5–5.2)
POTASSIUM SERPL-SCNC: 3.1 MMOL/L (ref 3.5–5.2)
POTASSIUM SERPL-SCNC: 3.3 MMOL/L (ref 3.5–5.2)
RBC # BLD AUTO: 3.34 10*6/MM3 (ref 4.14–5.8)
SODIUM SERPL-SCNC: 125 MMOL/L (ref 136–145)
SODIUM SERPL-SCNC: 128 MMOL/L (ref 136–145)
SODIUM SERPL-SCNC: 128 MMOL/L (ref 136–145)
WBC NRBC COR # BLD AUTO: 8.41 10*3/MM3 (ref 3.4–10.8)

## 2024-03-02 PROCEDURE — 25010000002 POTASSIUM CHLORIDE 10 MEQ/100ML SOLUTION: Performed by: PEDIATRICS

## 2024-03-02 PROCEDURE — 83735 ASSAY OF MAGNESIUM: CPT | Performed by: PEDIATRICS

## 2024-03-02 PROCEDURE — 25010000002 THIAMINE PER 100 MG: Performed by: NURSE PRACTITIONER

## 2024-03-02 PROCEDURE — 80048 BASIC METABOLIC PNL TOTAL CA: CPT | Performed by: PEDIATRICS

## 2024-03-02 PROCEDURE — 84100 ASSAY OF PHOSPHORUS: CPT | Performed by: PEDIATRICS

## 2024-03-02 PROCEDURE — 74177 CT ABD & PELVIS W/CONTRAST: CPT

## 2024-03-02 PROCEDURE — 85025 COMPLETE CBC W/AUTO DIFF WBC: CPT | Performed by: PEDIATRICS

## 2024-03-02 PROCEDURE — 25810000003 SODIUM CHLORIDE 0.9 % SOLUTION: Performed by: PEDIATRICS

## 2024-03-02 PROCEDURE — 99239 HOSP IP/OBS DSCHRG MGMT >30: CPT | Performed by: PEDIATRICS

## 2024-03-02 PROCEDURE — 25010000002 HEPARIN (PORCINE) PER 1000 UNITS: Performed by: NURSE PRACTITIONER

## 2024-03-02 PROCEDURE — 84295 ASSAY OF SERUM SODIUM: CPT | Performed by: PEDIATRICS

## 2024-03-02 PROCEDURE — 25510000001 IOPAMIDOL 61 % SOLUTION: Performed by: PEDIATRICS

## 2024-03-02 PROCEDURE — 84132 ASSAY OF SERUM POTASSIUM: CPT | Performed by: PEDIATRICS

## 2024-03-02 PROCEDURE — 0 DEXTROSE 5 % SOLUTION: Performed by: NURSE PRACTITIONER

## 2024-03-02 PROCEDURE — 63710000001 ONDANSETRON ODT 4 MG TABLET DISPERSIBLE: Performed by: NURSE PRACTITIONER

## 2024-03-02 RX ORDER — FENTANYL/ROPIVACAINE/NS/PF 2-625MCG/1
15 PLASTIC BAG, INJECTION (ML) EPIDURAL ONCE
Status: COMPLETED | OUTPATIENT
Start: 2024-03-02 | End: 2024-03-02

## 2024-03-02 RX ORDER — ALPRAZOLAM 0.25 MG/1
0.25 TABLET ORAL DAILY PRN
Status: DISCONTINUED | OUTPATIENT
Start: 2024-03-02 | End: 2024-03-02 | Stop reason: HOSPADM

## 2024-03-02 RX ORDER — FOLIC ACID 1 MG/1
1 TABLET ORAL DAILY
Qty: 30 TABLET | Refills: 0 | Status: SHIPPED | OUTPATIENT
Start: 2024-03-03

## 2024-03-02 RX ORDER — PROMETHAZINE HYDROCHLORIDE 25 MG/1
25 TABLET ORAL EVERY 6 HOURS PRN
Qty: 10 TABLET | Refills: 0 | Status: SHIPPED | OUTPATIENT
Start: 2024-03-02

## 2024-03-02 RX ORDER — SERTRALINE HYDROCHLORIDE 25 MG/1
25 TABLET, FILM COATED ORAL DAILY
Qty: 30 TABLET | Refills: 0 | Status: SHIPPED | OUTPATIENT
Start: 2024-03-03

## 2024-03-02 RX ORDER — SERTRALINE HYDROCHLORIDE 25 MG/1
25 TABLET, FILM COATED ORAL DAILY
Status: DISCONTINUED | OUTPATIENT
Start: 2024-03-02 | End: 2024-03-02 | Stop reason: HOSPADM

## 2024-03-02 RX ORDER — POTASSIUM CHLORIDE 20 MEQ/1
40 TABLET, EXTENDED RELEASE ORAL ONCE
Status: COMPLETED | OUTPATIENT
Start: 2024-03-02 | End: 2024-03-02

## 2024-03-02 RX ORDER — LANOLIN ALCOHOL/MO/W.PET/CERES
100 CREAM (GRAM) TOPICAL DAILY
Qty: 30 TABLET | Refills: 0 | Status: SHIPPED | OUTPATIENT
Start: 2024-03-06

## 2024-03-02 RX ADMIN — FOLIC ACID 1 MG: 1 TABLET ORAL at 08:27

## 2024-03-02 RX ADMIN — PANTOPRAZOLE SODIUM 40 MG: 40 TABLET, DELAYED RELEASE ORAL at 05:02

## 2024-03-02 RX ADMIN — POTASSIUM CHLORIDE 10 MEQ: 7.46 INJECTION, SOLUTION INTRAVENOUS at 00:17

## 2024-03-02 RX ADMIN — POTASSIUM CHLORIDE 10 MEQ: 7.46 INJECTION, SOLUTION INTRAVENOUS at 02:50

## 2024-03-02 RX ADMIN — SERTRALINE HYDROCHLORIDE 25 MG: 25 TABLET ORAL at 12:09

## 2024-03-02 RX ADMIN — IOPAMIDOL 80 ML: 612 INJECTION, SOLUTION INTRAVENOUS at 13:16

## 2024-03-02 RX ADMIN — THIAMINE HYDROCHLORIDE 200 MG: 100 INJECTION, SOLUTION INTRAMUSCULAR; INTRAVENOUS at 05:02

## 2024-03-02 RX ADMIN — POTASSIUM CHLORIDE 40 MEQ: 1500 TABLET, EXTENDED RELEASE ORAL at 16:57

## 2024-03-02 RX ADMIN — THIAMINE HYDROCHLORIDE 200 MG: 100 INJECTION, SOLUTION INTRAMUSCULAR; INTRAVENOUS at 14:04

## 2024-03-02 RX ADMIN — POTASSIUM PHOSPHATE, MONOBASIC POTASSIUM PHOSPHATE, DIBASIC 15 MMOL: 224; 236 INJECTION, SOLUTION, CONCENTRATE INTRAVENOUS at 07:47

## 2024-03-02 RX ADMIN — HEPARIN SODIUM 5000 UNITS: 5000 INJECTION INTRAVENOUS; SUBCUTANEOUS at 14:04

## 2024-03-02 RX ADMIN — DEXTROSE MONOHYDRATE 75 ML/HR: 50 INJECTION, SOLUTION INTRAVENOUS at 01:47

## 2024-03-02 RX ADMIN — HEPARIN SODIUM 5000 UNITS: 5000 INJECTION INTRAVENOUS; SUBCUTANEOUS at 05:02

## 2024-03-02 RX ADMIN — POTASSIUM & SODIUM PHOSPHATES POWDER PACK 280-160-250 MG 1 PACKET: 280-160-250 PACK at 18:07

## 2024-03-02 RX ADMIN — POTASSIUM CHLORIDE 10 MEQ: 7.46 INJECTION, SOLUTION INTRAVENOUS at 03:49

## 2024-03-02 RX ADMIN — POTASSIUM CHLORIDE 10 MEQ: 7.46 INJECTION, SOLUTION INTRAVENOUS at 05:03

## 2024-03-02 RX ADMIN — POTASSIUM CHLORIDE 10 MEQ: 7.46 INJECTION, SOLUTION INTRAVENOUS at 01:47

## 2024-03-02 RX ADMIN — ONDANSETRON 4 MG: 4 TABLET, ORALLY DISINTEGRATING ORAL at 05:02

## 2024-03-02 NOTE — PROGRESS NOTES
CT scan shows no acute abnormality.  Electrolytes are slowly improving.    Anticipate EGD on Monday, 3/4/2024.    Addendum:    Discussed with Dr. Abril Quigley MD.  Appetite improving.  Patient is drinking 2 boosts a day.  He wants to go home.  He can be scheduled for EGD as outpatient with his gastroenterologist, Dr. Chinedu Sethi MD.    Mark I. Brunner, MD  03/02/24  18:12 EST

## 2024-03-02 NOTE — PLAN OF CARE
Goal Outcome Evaluation:           Progress: no change  Outcome Evaluation: VSS. SR. RA. K+ replacement protocol initiated and performed throughout the night. Intermittent nausea and anxiety throughout the night. PRN medications provided. Pt slept between care. Pt scheduled for CT this morning.

## 2024-03-02 NOTE — DISCHARGE SUMMARY
[facility] Medicine Services  DISCHARGE SUMMARY    Patient Name: Campos Alba  : 1960  MRN: [id number]    Date of Admission: 2024  2:32 PM  Date of Discharge:  3/2/2024  Primary Care Physician: Kelby Mann MD    Consults       Date and Time Order Name Status Description    3/1/2024 11:45 AM Inpatient Anesthesiology Consult      2024  5:44 PM Inpatient Gastroenterology Consult Completed             Hospital Course     Presenting Problem: wt loss,     Active Hospital Problems    Diagnosis  POA    **Hyponatremia [E87.1]  Yes    Anxiety associated with depression [F41.8]  Yes    Severe malnutrition [E43]  Yes    Hypokalemia [E87.6]  Yes    Weight loss [R63.4]  Yes      Resolved Hospital Problems    Diagnosis Date Resolved POA    Nausea [R11.0] 2024 Yes    Dizziness [R42] 2024 Yes    ZAIRE (acute kidney injury) [N17.9] 2024 Yes          Hospital Course:  Campos Alba is a 63 y.o. male with a history of hypertension and hyperlipidemia presents to the ED initially after seen by his PCP for EKG changes.  Found to have had significant weight loss with nausea and vomiting and multiple electrolyte derangements.        Nausea  Unintentional weight loss (50lbs since October)  Poor appetite  --GI consulted--recommendations included a CT abdomen and pelvis, MRI of the brain as well as tentatively was planning on doing an EGD on 3/4 while inpatient.  --EGD in  which was reportedly negative except for gastritis  --uses CBD gummies  --Repeat CT A/P here was also unremarkable for any acute findings.  -- Also recommending a MRI of the brain, of which patient will need conscious sedation due to severe anxiety and claustrophobia.  Have discussed this with the patient at great lengths and have given instructions on PCP follow-up and how to get this ordered.  -- Was tolerating approximately 2 boost per day at discharge.  -- Recommend follow-up with   Jossie, his GI doctor for a repeat EGD in the next couple of weeks.    Dizziness--resolved after IV fluid hydration  --MRI as above  --questionable med related + dehydration       Hyponatremia  -- Improved from admission.  Was getting D5 water infusions at some point which once this was stopped his sodium did improve.    Hypokalemia  Hypophosphatemia  -- Required multiple replacements during hospitalization.  Patient was given a dose of potassium chloride as well as sodium phosphorus prior to discharge after his last lab result.  --Discharge patient home with K-Phos daily replacement   -- Patient to get repeat labs this week with his PCP which he has agreed to    ZAIRE  -- Improving from admission       ETOH use   -- No evidence of withdrawal during inpatient stay.  --Discussed importance of cessation especially in the setting of weight loss and poor nutrition and poor oral intake    Severe anxiety and PTSD  -- Overall feel that this is a contributing factor to his ongoing symptoms.  Patient has been reluctant to take any type of medication for these issues.  His severe anxiety PTSD affected his hospital stay as well to the point where he no longer wanted to stay in the hospital.  -- After much discussion with patient regarding the symptoms, agreement was made for him to go home with close PCP follow-up as well as GI follow-up.  -- Patient denies any SI or HI.  Went ahead and started patient on Zoloft at discharge.  Discussed side effects of Zoloft including increased risk of SI.  Patient contracts for safety at discharge    Discharge Follow Up Recommendations for outpatient labs/diagnostics:  Follow-up with PCP on 3/4 or 3/5 for evaluation and repeat lab work especially for his electrolytes.    Day of Discharge     HPI:   Patient does feel better, his dizziness is improved he has been able to tolerate a couple of boost every day.  He is having severe anxiety and can no longer tolerate staying in the  [facility].      Vital Signs:   Temp:  [98.5 °F (36.9 °C)-98.6 °F (37 °C)] 98.5 °F (36.9 °C)  Heart Rate:  [75-76] 76  Resp:  [18] 18  BP: (126-156)/(86-99) 127/88      Physical Exam:  Constitutional: No acute distress, awake, alert  HENT: NCAT, mucous membranes moist  Respiratory: Clear to auscultation bilaterally, respiratory effort normal   Cardiovascular: RRR, no murmurs, rubs, or gallops  Gastrointestinal: Positive bowel sounds, soft, nontender, nondistended  Musculoskeletal: No bilateral ankle edema  Psychiatric: Extremely anxious, extremely tearful with any type of conversation  Neurologic: Oriented x 3, strength symmetric in all extremities, Cranial Nerves grossly intact to confrontation, speech clear  Skin: No rashes    Pertinent  and/or Most Recent Results     LAB RESULTS:      Lab 03/02/24  0509 03/01/24  0359 02/29/24  1300   WBC 8.41 8.21 15.20*   HEMOGLOBIN 11.8* 11.2* 13.4   HEMATOCRIT 32.0* 30.0* 36.9*   PLATELETS 158 160 222   NEUTROS ABS 6.58 6.61 12.71*   IMMATURE GRANS (ABS) 0.07* 0.05 0.11*   LYMPHS ABS 0.90 0.75 0.82   MONOS ABS 0.76 0.75 1.47*   EOS ABS 0.08 0.04 0.06   MCV 95.8 95.2 97.9*         Lab 03/02/24  0958 03/02/24  0509 03/01/24  1749 03/01/24  1205 03/01/24  0359 03/01/24  0019 02/29/24  1755 02/29/24  1300   SODIUM 128* 125* 129*  --  126* 129*  130*   < > 123*   POTASSIUM 3.3* 3.1* 2.8*  --  2.4*  --   --  2.4*   CHLORIDE  --  97* 100  --  98  --   --  89*   CO2  --  15.0* 15.0*  --  17.0*  --   --  19.0*   ANION GAP  --  13.0 14.0  --  11.0  --   --  15.0   BUN  --  11 14  --  20  --   --  21   CREATININE  --  0.98 1.14  --  1.34*  --   --  1.64*   EGFR  --  85.6 71.4  --  58.8*  --   --  46.1*   GLUCOSE  --  96 104*  --  92  --   --  120*   CALCIUM  --  10.7* 10.9*  --  10.8*  --   --  12.0*   MAGNESIUM  --  1.8  --   --  2.0  --   --  2.2   PHOSPHORUS  --  2.1* 2.5 1.4* 1.7* 1.5*  --  1.6*   TSH  --   --   --   --   --   --   --  2.190    < > = values in this interval not  displayed.         Lab 02/29/24  1300   TOTAL PROTEIN 7.2   ALBUMIN 4.3   GLOBULIN 2.9   ALT (SGPT) 119*   AST (SGOT) 54*   BILIRUBIN 0.7   ALK PHOS 110         Lab 03/01/24  0359 02/29/24 2010 02/29/24  1755 02/29/24  1300   PROBNP  --   --   --  194.9   HSTROP T 31* 32* 34* 34*                 Brief Urine Lab Results       None          Microbiology Results (last 10 days)       ** No results found for the last 240 hours. **            CT Abdomen Pelvis With Contrast    Result Date: 3/2/2024  CT ABDOMEN PELVIS W CONTRAST Date of Exam: 3/2/2024 1:03 PM EST Indication: Unintentional weight loss of 50 lbs.   Persistent nausea. Comparison: CT abdomen pelvis 4/7/2021 Technique: Axial CT images were obtained of the abdomen and pelvis following the uneventful intravenous administration of 80 mL Isovue-300. Reconstructed coronal and sagittal images were also obtained. Automated exposure control and iterative construction methods were used. Findings: Heart size within normal limits. No acute findings within the partially imaged lower lungs. Possible mild steatosis based on parenchymal low density. Normal size and contour. No discrete liver lesion. No gallbladder wall thickening, pericholecystic fluid or distention. No visualized calcified stones. No dilation of the biliary tree. No active pancreatitis or suspicious mass. Spleen is normal in size. No concerning adrenal nodule. Symmetric renal size, contour and enhancement. Bilobed low-density right renal sinus cyst. No hydronephrosis. Urinary bladder unremarkable. There is moderate prostamegaly with presumed underlying BPH changes measuring 6.1 cm  in transverse dimension. Expected course and caliber of stomach and duodenum. Colonic diverticulosis. No bowel obstruction or active inflammation. Appendix is normal. Diffuse aortic atherosclerotic disease without aneurysm. No suspicious adenopathy. No ascites, free air or drainable collection. No acute soft tissue abnormality.  Unremarkable appearing right hip prosthesis. Mild to moderate degenerative osteoarthritis of the left hip with early changes of femoral head avascular necrosis. No fragmentation or collapse. Partial right SI joint ankylosis which can be seen with prior infectious or inflammatory sacroiliitis.. Bilateral L4 pars defects with grade 1 anterolisthesis and severe degenerative related endplate change. Multilevel lumbar spondylosis without acute displaced fracture or aggressive lesion.     Impression: 1. No acute CT findings. No suspicious soft tissue mass or adenopathy in the abdomen to suggest a malignant process. 2. Moderate prostamegaly with presumed underlying BPH related changes. 3. Colonic diverticulosis. 4. Other chronic/ancillary findings detailed above. Electronically Signed: Montana Alamo MD  3/2/2024 1:53 PM EST  Workstation ID: [id number]    XR Chest 1 View    Result Date: 2/29/2024  XR CHEST 1 VW Date of Exam: 2/29/2024 12:14 PM EST Indication: Dysrhythmia triage protocol Comparison: 8/31/2021 Findings: The lungs appear adequately aerated without consolidation or mass. No pleural effusion or pneumothorax is identified. The cardiomediastinal silhouette and pulmonary vasculature appear within normal limits. No acute or suspicious osseous lesion is identified.     Impression: 1.No acute radiographic abnormality is identified. Electronically Signed: Vitaliy Smith MD  2/29/2024 12:43 PM EST  Workstation ID: [id number]                 Plan for Follow-up of Pending Labs/Results:     Discharge Details        Discharge Medications        New Medications        Instructions Start Date   folic acid 1 MG tablet  Commonly known as: FOLVITE   1 mg, Oral, Daily   Start Date: March 3, 2024     potassium phosphate (monobasic) 500 MG tablet  Commonly known as: K-PHOS   500 mg, Oral, 2 Times Daily      promethazine 25 MG tablet  Commonly known as: PHENERGAN   25 mg, Oral, Every 6 Hours PRN, Use if still symptomatic after  zofran      sertraline 25 MG tablet  Commonly known as: ZOLOFT   25 mg, Oral, Daily   Start Date: March 3, 2024     thiamine 100 MG tablet  Commonly known as: VITAMIN B1   100 mg, Oral, Daily   Start Date: March 6, 2024            Continue These Medications        Instructions Start Date   ALPRAZolam 0.25 MG tablet  Commonly known as: XANAX   0.125 mg, Oral, Daily PRN      omeprazole 40 MG capsule  Commonly known as: priLOSEC   40 mg, Oral, Daily      ondansetron ODT 4 MG disintegrating tablet  Commonly known as: ZOFRAN-ODT   4 mg, Translingual, Every 8 Hours PRN      pravastatin 40 MG tablet  Commonly known as: PRAVACHOL   40 mg, Oral, Daily      prazosin 5 MG capsule  Commonly known as: MINIPRESS   5 mg, Oral, Nightly             Stop These Medications      clotrimazole 1 % cream  Commonly known as: LOTRIMIN              No Known Allergies      Discharge Disposition:      Diet:  Hospital:  Diet Order   Procedures    Diet: Regular/House Diet; Texture: Regular Texture (IDDSI 7); Fluid Consistency: Thin (IDDSI 0)            Activity:      Restrictions or Other Recommendations:         CODE STATUS:    Code Status and Medical Interventions:   Ordered at: 02/29/24 1702     Medical Intervention Limits:    NO intubation (DNI)     Level Of Support Discussed With:    Patient     Code Status (Patient has no pulse and is not breathing):    No CPR (Do Not Attempt to Resuscitate)     Medical Interventions (Patient has pulse or is breathing):    Limited Support       No future appointments.    Additional Instructions for the Follow-ups that You Need to Schedule       Discharge Follow-up with PCP   As directed       Currently Documented PCP:    Kelby Mann MD    PCP Phone Number:    [phone]     Follow Up Details: Call Monday for follow-up appointment in the next week                      Abril Quigley MD  03/02/24      Time Spent on Discharge:  I spent  43  minutes on this discharge activity which included:  face-to-face encounter with the patient, reviewing the data in the system, coordination of the care with the nursing staff as well as consultants, documentation, and entering orders.

## 2024-03-02 NOTE — CASE MANAGEMENT/SOCIAL WORK
Continued Stay Note   Isabela     Patient Name: Campos Alba  MRN: [id number]  Today's Date: 3/2/2024    Admit Date: 2/29/2024    Plan: Home with outpatient mental health resources.   Discharge Plan       Row Name 03/02/24 1401       Plan    Plan Home with outpatient mental health resources.    Plan Comments  called MSW about pt. needing outpatient resources for mental health services. MSW provided pt. with requested information. Pt. reports he will look over resources once he gets home. No other needs or concerns at this time. MSW is available.    Final Discharge Disposition Code 01 - home or self-care                   Discharge Codes    No documentation.                 Expected Discharge Date and Time       Expected Discharge Date Expected Discharge Time    Mar 2, 2024               KASSIDY De Oliveira

## 2024-03-04 NOTE — PAYOR COMM NOTE
"Campos Alba \"Ed\" (63 y.o. Male)     [id number]     Mia Tenorio RN  Utilization Review  [phone]  [phone]        Date of Birth   1960    Social Security Number       Address   [address] [address]    Home Phone   [phone]    MRN   [id number]       Holiness   None    Marital Status                               Admission Date   24    Admission Type   Emergency    Admitting Provider   Abril Quigley MD    Attending Provider       Department, Room/Bed   [facility] 6B, N634/1       Discharge Date   3/2/2024    Discharge Disposition   Home or Self Care    Discharge Destination   Home                              Attending Provider: (none)   Allergies: No Known Allergies    Isolation: None   Infection: None   Code Status: Prior    Ht: 182.9 cm (72\")   Wt: 101 kg (223 lb)    Admission Cmt: None   Principal Problem: Hyponatremia [E87.1]                   Active Insurance as of 2024       Primary Coverage       Payor Plan Insurance Group Employer/Plan Group    ANTHEM MEDICARE REPLACEMENT ANTHEM MEDICARE ADVANTAGE [id number]       Payor Plan Address Payor Plan Phone Number Payor Plan Fax Number Effective Dates    [address] [phone]  2024 - None Entered    [facility] [address]         Subscriber Name Subscriber Birth Date Member ID       CAMPOS ALBA 1960 [id number]                     Emergency Contacts        (Rel.) Home Phone Work Phone Mobile Phone    [place] (Friend) [phone] -- --                 Discharge Summary        Abril Quigley MD at [facility]              [facility] Medicine Services  DISCHARGE SUMMARY    Patient Name: Campos Alba  : 1960  MRN: [id number]    Date of Admission: 2024  2:32 PM  Date of Discharge:  3/2/2024  Primary Care Physician: Kelby Mann MD    Consults       Date and Time Order " Name Status Description    3/1/2024 11:45 AM Inpatient Anesthesiology Consult      2/29/2024  5:44 PM Inpatient Gastroenterology Consult Completed             Hospital Course     Presenting Problem: wt loss,     Active Hospital Problems    Diagnosis  POA    **Hyponatremia [E87.1]  Yes    Anxiety associated with depression [F41.8]  Yes    Severe malnutrition [E43]  Yes    Hypokalemia [E87.6]  Yes    Weight loss [R63.4]  Yes      Resolved Hospital Problems    Diagnosis Date Resolved POA    Nausea [R11.0] 03/02/2024 Yes    Dizziness [R42] 03/02/2024 Yes    ZAIRE (acute kidney injury) [N17.9] 03/02/2024 Yes          Hospital Course:  Campos Alba is a 63 y.o. male with a history of hypertension and hyperlipidemia presents to the ED initially after seen by his PCP for EKG changes.  Found to have had significant weight loss with nausea and vomiting and multiple electrolyte derangements.        Nausea  Unintentional weight loss (50lbs since October)  Poor appetite  --GI consulted--recommendations included a CT abdomen and pelvis, MRI of the brain as well as tentatively was planning on doing an EGD on 3/4 while inpatient.  --EGD in Nov 23 which was reportedly negative except for gastritis  --uses CBD gummies  --Repeat CT A/P here was also unremarkable for any acute findings.  -- Also recommending a MRI of the brain, of which patient will need conscious sedation due to severe anxiety and claustrophobia.  Have discussed this with the patient at great lengths and have given instructions on PCP follow-up and how to get this ordered.  -- Was tolerating approximately 2 boost per day at discharge.  -- Recommend follow-up with Dr. Sethi, his GI doctor for a repeat EGD in the next couple of weeks.    Dizziness--resolved after IV fluid hydration  --MRI as above  --questionable med related + dehydration       Hyponatremia  -- Improved from admission.  Was getting D5 water infusions at some point which once this was stopped his  sodium did improve.    Hypokalemia  Hypophosphatemia  -- Required multiple replacements during hospitalization.  Patient was given a dose of potassium chloride as well as sodium phosphorus prior to discharge after his last lab result.  --Discharge patient home with K-Phos daily replacement   -- Patient to get repeat labs this week with his PCP which he has agreed to    ZAIRE  -- Improving from admission       ETOH use   -- No evidence of withdrawal during inpatient stay.  --Discussed importance of cessation especially in the setting of weight loss and poor nutrition and poor oral intake    Severe anxiety and PTSD  -- Overall feel that this is a contributing factor to his ongoing symptoms.  Patient has been reluctant to take any type of medication for these issues.  His severe anxiety PTSD affected his hospital stay as well to the point where he no longer wanted to stay in the hospital.  -- After much discussion with patient regarding the symptoms, agreement was made for him to go home with close PCP follow-up as well as GI follow-up.  -- Patient denies any SI or HI.  Went ahead and started patient on Zoloft at discharge.  Discussed side effects of Zoloft including increased risk of SI.  Patient contracts for safety at discharge    Discharge Follow Up Recommendations for outpatient labs/diagnostics:  Follow-up with PCP on 3/4 or 3/5 for evaluation and repeat lab work especially for his electrolytes.    Day of Discharge     HPI:   Patient does feel better, his dizziness is improved he has been able to tolerate a couple of boost every day.  He is having severe anxiety and can no longer tolerate staying in the hospital.      Vital Signs:   Temp:  [98.5 °F (36.9 °C)-98.6 °F (37 °C)] 98.5 °F (36.9 °C)  Heart Rate:  [75-76] 76  Resp:  [18] 18  BP: (126-156)/(86-99) 127/88      Physical Exam:  Constitutional: No acute distress, awake, alert  HENT: NCAT, mucous membranes moist  Respiratory: Clear to auscultation bilaterally,  respiratory effort normal   Cardiovascular: RRR, no murmurs, rubs, or gallops  Gastrointestinal: Positive bowel sounds, soft, nontender, nondistended  Musculoskeletal: No bilateral ankle edema  Psychiatric: Extremely anxious, extremely tearful with any type of conversation  Neurologic: Oriented x 3, strength symmetric in all extremities, Cranial Nerves grossly intact to confrontation, speech clear  Skin: No rashes    Pertinent  and/or Most Recent Results     LAB RESULTS:      Lab 03/02/24  0509 03/01/24  0359 02/29/24  1300   WBC 8.41 8.21 15.20*   HEMOGLOBIN 11.8* 11.2* 13.4   HEMATOCRIT 32.0* 30.0* 36.9*   PLATELETS 158 160 222   NEUTROS ABS 6.58 6.61 12.71*   IMMATURE GRANS (ABS) 0.07* 0.05 0.11*   LYMPHS ABS 0.90 0.75 0.82   MONOS ABS 0.76 0.75 1.47*   EOS ABS 0.08 0.04 0.06   MCV 95.8 95.2 97.9*         Lab 03/02/24  0958 03/02/24  0509 03/01/24  1749 03/01/24  1205 03/01/24  0359 03/01/24  0019 02/29/24  1755 02/29/24  1300   SODIUM 128* 125* 129*  --  126* 129*  130*   < > 123*   POTASSIUM 3.3* 3.1* 2.8*  --  2.4*  --   --  2.4*   CHLORIDE  --  97* 100  --  98  --   --  89*   CO2  --  15.0* 15.0*  --  17.0*  --   --  19.0*   ANION GAP  --  13.0 14.0  --  11.0  --   --  15.0   BUN  --  11 14  --  20  --   --  21   CREATININE  --  0.98 1.14  --  1.34*  --   --  1.64*   EGFR  --  85.6 71.4  --  58.8*  --   --  46.1*   GLUCOSE  --  96 104*  --  92  --   --  120*   CALCIUM  --  10.7* 10.9*  --  10.8*  --   --  12.0*   MAGNESIUM  --  1.8  --   --  2.0  --   --  2.2   PHOSPHORUS  --  2.1* 2.5 1.4* 1.7* 1.5*  --  1.6*   TSH  --   --   --   --   --   --   --  2.190    < > = values in this interval not displayed.         Lab 02/29/24  1300   TOTAL PROTEIN 7.2   ALBUMIN 4.3   GLOBULIN 2.9   ALT (SGPT) 119*   AST (SGOT) 54*   BILIRUBIN 0.7   ALK PHOS 110         Lab 03/01/24  0359 02/29/24 2010 02/29/24  1755 02/29/24  1300   PROBNP  --   --   --  194.9   HSTROP T 31* 32* 34* 34*                 Brief Urine Lab Results        None          Microbiology Results (last 10 days)       ** No results found for the last 240 hours. **            CT Abdomen Pelvis With Contrast    Result Date: 3/2/2024  CT ABDOMEN PELVIS W CONTRAST Date of Exam: 3/2/2024 1:03 PM EST Indication: Unintentional weight loss of 50 lbs.   Persistent nausea. Comparison: CT abdomen pelvis 4/7/2021 Technique: Axial CT images were obtained of the abdomen and pelvis following the uneventful intravenous administration of 80 mL Isovue-300. Reconstructed coronal and sagittal images were also obtained. Automated exposure control and iterative construction methods were used. Findings: Heart size within normal limits. No acute findings within the partially imaged lower lungs. Possible mild steatosis based on parenchymal low density. Normal size and contour. No discrete liver lesion. No gallbladder wall thickening, pericholecystic fluid or distention. No visualized calcified stones. No dilation of the biliary tree. No active pancreatitis or suspicious mass. Spleen is normal in size. No concerning adrenal nodule. Symmetric renal size, contour and enhancement. Bilobed low-density right renal sinus cyst. No hydronephrosis. Urinary bladder unremarkable. There is moderate prostamegaly with presumed underlying BPH changes measuring 6.1 cm  in transverse dimension. Expected course and caliber of stomach and duodenum. Colonic diverticulosis. No bowel obstruction or active inflammation. Appendix is normal. Diffuse aortic atherosclerotic disease without aneurysm. No suspicious adenopathy. No ascites, free air or drainable collection. No acute soft tissue abnormality. Unremarkable appearing right hip prosthesis. Mild to moderate degenerative osteoarthritis of the left hip with early changes of femoral head avascular necrosis. No fragmentation or collapse. Partial right SI joint ankylosis which can be seen with prior infectious or inflammatory sacroiliitis.. Bilateral L4 pars defects  with grade 1 anterolisthesis and severe degenerative related endplate change. Multilevel lumbar spondylosis without acute displaced fracture or aggressive lesion.     Impression: 1. No acute CT findings. No suspicious soft tissue mass or adenopathy in the abdomen to suggest a malignant process. 2. Moderate prostamegaly with presumed underlying BPH related changes. 3. Colonic diverticulosis. 4. Other chronic/ancillary findings detailed above. Electronically Signed: Montana Alamo MD  3/2/2024 1:53 PM EST  Workstation ID: [id number]    XR Chest 1 View    Result Date: 2/29/2024  XR CHEST 1 VW Date of Exam: 2/29/2024 12:14 PM EST Indication: Dysrhythmia triage protocol Comparison: 8/31/2021 Findings: The lungs appear adequately aerated without consolidation or mass. No pleural effusion or pneumothorax is identified. The cardiomediastinal silhouette and pulmonary vasculature appear within normal limits. No acute or suspicious osseous lesion is identified.     Impression: 1.No acute radiographic abnormality is identified. Electronically Signed: Vitaliy Smith MD  2/29/2024 12:43 PM EST  Workstation ID: [id number]                 Plan for Follow-up of Pending Labs/Results:     Discharge Details        Discharge Medications        New Medications        Instructions Start Date   folic acid 1 MG tablet  Commonly known as: FOLVITE   1 mg, Oral, Daily   Start Date: March 3, 2024     potassium phosphate (monobasic) 500 MG tablet  Commonly known as: K-PHOS   500 mg, Oral, 2 Times Daily      promethazine 25 MG tablet  Commonly known as: PHENERGAN   25 mg, Oral, Every 6 Hours PRN, Use if still symptomatic after zofran      sertraline 25 MG tablet  Commonly known as: ZOLOFT   25 mg, Oral, Daily   Start Date: March 3, 2024     thiamine 100 MG tablet  Commonly known as: VITAMIN B1   100 mg, Oral, Daily   Start Date: March 6, 2024            Continue These Medications        Instructions Start Date   ALPRAZolam 0.25 MG tablet  Commonly  known as: XANAX   0.125 mg, Oral, Daily PRN      omeprazole 40 MG capsule  Commonly known as: priLOSEC   40 mg, Oral, Daily      ondansetron ODT 4 MG disintegrating tablet  Commonly known as: ZOFRAN-ODT   4 mg, Translingual, Every 8 Hours PRN      pravastatin 40 MG tablet  Commonly known as: PRAVACHOL   40 mg, Oral, Daily      prazosin 5 MG capsule  Commonly known as: MINIPRESS   5 mg, Oral, Nightly             Stop These Medications      clotrimazole 1 % cream  Commonly known as: LOTRIMIN              No Known Allergies      Discharge Disposition:      Diet:  Hospital:  Diet Order   Procedures    Diet: Regular/House Diet; Texture: Regular Texture (IDDSI 7); Fluid Consistency: Thin (IDDSI 0)            Activity:      Restrictions or Other Recommendations:         CODE STATUS:    Code Status and Medical Interventions:   Ordered at: 02/29/24 1702     Medical Intervention Limits:    NO intubation (DNI)     Level Of Support Discussed With:    Patient     Code Status (Patient has no pulse and is not breathing):    No CPR (Do Not Attempt to Resuscitate)     Medical Interventions (Patient has pulse or is breathing):    Limited Support       No future appointments.    Additional Instructions for the Follow-ups that You Need to Schedule       Discharge Follow-up with PCP   As directed       Currently Documented PCP:    Kelby Mann MD    PCP Phone Number:    [phone]     Follow Up Details: Call Monday for follow-up appointment in the next week                      Abril Quigley MD  03/02/24      Time Spent on Discharge:  I spent  43  minutes on this discharge activity which included: face-to-face encounter with the patient, reviewing the data in the system, coordination of the care with the nursing staff as well as consultants, documentation, and entering orders.            Electronically signed by Abril Quigley MD at 03/03/24 0501

## 2024-03-05 LAB
QT INTERVAL: 332 MS
QT INTERVAL: 372 MS
QTC INTERVAL: 444 MS
QTC INTERVAL: 447 MS

## 2024-03-07 ENCOUNTER — TRANSCRIBE ORDERS (OUTPATIENT)
Dept: ADMINISTRATIVE | Facility: HOSPITAL | Age: 64
End: 2024-03-07
Payer: MEDICARE

## 2024-03-07 DIAGNOSIS — G91.2 NORMAL PRESSURE HYDROCEPHALUS: Primary | ICD-10-CM

## 2025-06-09 ENCOUNTER — HOSPITAL ENCOUNTER (OUTPATIENT)
Dept: GENERAL RADIOLOGY | Facility: HOSPITAL | Age: 65
Discharge: HOME | End: 2025-06-09
Admitting: FAMILY MEDICINE
Payer: MEDICARE

## 2025-06-09 ENCOUNTER — TRANSCRIBE ORDERS (OUTPATIENT)
Dept: ADMINISTRATIVE | Facility: HOSPITAL | Age: 65
End: 2025-06-09
Payer: MEDICARE

## 2025-06-09 DIAGNOSIS — M25.511 RIGHT SHOULDER PAIN, UNSPECIFIED CHRONICITY: Primary | ICD-10-CM

## 2025-06-09 PROCEDURE — 73030 X-RAY EXAM OF SHOULDER: CPT
